# Patient Record
Sex: MALE | Race: WHITE | NOT HISPANIC OR LATINO | URBAN - METROPOLITAN AREA
[De-identification: names, ages, dates, MRNs, and addresses within clinical notes are randomized per-mention and may not be internally consistent; named-entity substitution may affect disease eponyms.]

---

## 2018-10-23 ENCOUNTER — OUTPATIENT (OUTPATIENT)
Dept: OUTPATIENT SERVICES | Facility: HOSPITAL | Age: 63
LOS: 1 days | Discharge: HOME | End: 2018-10-23

## 2018-10-23 DIAGNOSIS — R05 COUGH: ICD-10-CM

## 2018-10-23 DIAGNOSIS — M25.9 JOINT DISORDER, UNSPECIFIED: ICD-10-CM

## 2018-12-20 ENCOUNTER — OUTPATIENT (OUTPATIENT)
Dept: OUTPATIENT SERVICES | Facility: HOSPITAL | Age: 63
LOS: 1 days | Discharge: HOME | End: 2018-12-20

## 2018-12-20 VITALS
SYSTOLIC BLOOD PRESSURE: 131 MMHG | DIASTOLIC BLOOD PRESSURE: 73 MMHG | TEMPERATURE: 98 F | OXYGEN SATURATION: 98 % | WEIGHT: 299.83 LBS | HEIGHT: 73 IN

## 2018-12-20 DIAGNOSIS — T84.84XA PAIN DUE TO INTERNAL ORTHOPEDIC PROSTHETIC DEVICES, IMPLANTS AND GRAFTS, INITIAL ENCOUNTER: ICD-10-CM

## 2018-12-20 DIAGNOSIS — Z01.818 ENCOUNTER FOR OTHER PREPROCEDURAL EXAMINATION: ICD-10-CM

## 2018-12-20 DIAGNOSIS — Z98.890 OTHER SPECIFIED POSTPROCEDURAL STATES: Chronic | ICD-10-CM

## 2018-12-20 LAB
ALBUMIN SERPL ELPH-MCNC: 4.4 G/DL — SIGNIFICANT CHANGE UP (ref 3.5–5.2)
ALP SERPL-CCNC: 77 U/L — SIGNIFICANT CHANGE UP (ref 30–115)
ALT FLD-CCNC: 44 U/L — HIGH (ref 0–41)
ANION GAP SERPL CALC-SCNC: 16 MMOL/L — HIGH (ref 7–14)
APPEARANCE UR: CLEAR — SIGNIFICANT CHANGE UP
APTT BLD: 31.1 SEC — SIGNIFICANT CHANGE UP (ref 27–39.2)
AST SERPL-CCNC: 22 U/L — SIGNIFICANT CHANGE UP (ref 0–41)
BASOPHILS # BLD AUTO: 0.12 K/UL — SIGNIFICANT CHANGE UP (ref 0–0.2)
BASOPHILS NFR BLD AUTO: 1.4 % — HIGH (ref 0–1)
BILIRUB SERPL-MCNC: 0.3 MG/DL — SIGNIFICANT CHANGE UP (ref 0.2–1.2)
BILIRUB UR-MCNC: NEGATIVE — SIGNIFICANT CHANGE UP
BLD GP AB SCN SERPL QL: SIGNIFICANT CHANGE UP
BUN SERPL-MCNC: 17 MG/DL — SIGNIFICANT CHANGE UP (ref 10–20)
CALCIUM SERPL-MCNC: 9.6 MG/DL — SIGNIFICANT CHANGE UP (ref 8.5–10.1)
CHLORIDE SERPL-SCNC: 99 MMOL/L — SIGNIFICANT CHANGE UP (ref 98–110)
CO2 SERPL-SCNC: 25 MMOL/L — SIGNIFICANT CHANGE UP (ref 17–32)
COLOR SPEC: YELLOW — SIGNIFICANT CHANGE UP
CREAT SERPL-MCNC: 0.9 MG/DL — SIGNIFICANT CHANGE UP (ref 0.7–1.5)
DIFF PNL FLD: NEGATIVE — SIGNIFICANT CHANGE UP
EOSINOPHIL # BLD AUTO: 0.3 K/UL — SIGNIFICANT CHANGE UP (ref 0–0.7)
EOSINOPHIL NFR BLD AUTO: 3.6 % — SIGNIFICANT CHANGE UP (ref 0–8)
EPI CELLS # UR: ABNORMAL /HPF
GLUCOSE SERPL-MCNC: 93 MG/DL — SIGNIFICANT CHANGE UP (ref 70–99)
GLUCOSE UR QL: 100 MG/DL
HCT VFR BLD CALC: 48.2 % — SIGNIFICANT CHANGE UP (ref 42–52)
HGB BLD-MCNC: 15.4 G/DL — SIGNIFICANT CHANGE UP (ref 14–18)
IMM GRANULOCYTES NFR BLD AUTO: 1.1 % — HIGH (ref 0.1–0.3)
INR BLD: 0.96 RATIO — SIGNIFICANT CHANGE UP (ref 0.65–1.3)
KETONES UR-MCNC: NEGATIVE — SIGNIFICANT CHANGE UP
LEUKOCYTE ESTERASE UR-ACNC: ABNORMAL
LYMPHOCYTES # BLD AUTO: 2.16 K/UL — SIGNIFICANT CHANGE UP (ref 1.2–3.4)
LYMPHOCYTES # BLD AUTO: 25.9 % — SIGNIFICANT CHANGE UP (ref 20.5–51.1)
MCHC RBC-ENTMCNC: 24.6 PG — LOW (ref 27–31)
MCHC RBC-ENTMCNC: 32 G/DL — SIGNIFICANT CHANGE UP (ref 32–37)
MCV RBC AUTO: 76.9 FL — LOW (ref 80–94)
MONOCYTES # BLD AUTO: 0.89 K/UL — HIGH (ref 0.1–0.6)
MONOCYTES NFR BLD AUTO: 10.7 % — HIGH (ref 1.7–9.3)
MRSA PCR RESULT.: NEGATIVE — SIGNIFICANT CHANGE UP
NEUTROPHILS # BLD AUTO: 4.77 K/UL — SIGNIFICANT CHANGE UP (ref 1.4–6.5)
NEUTROPHILS NFR BLD AUTO: 57.3 % — SIGNIFICANT CHANGE UP (ref 42.2–75.2)
NITRITE UR-MCNC: NEGATIVE — SIGNIFICANT CHANGE UP
NRBC # BLD: 0 /100 WBCS — SIGNIFICANT CHANGE UP (ref 0–0)
PH UR: 6.5 — SIGNIFICANT CHANGE UP (ref 5–8)
PLATELET # BLD AUTO: 273 K/UL — SIGNIFICANT CHANGE UP (ref 130–400)
POTASSIUM SERPL-MCNC: 4.9 MMOL/L — SIGNIFICANT CHANGE UP (ref 3.5–5)
POTASSIUM SERPL-SCNC: 4.9 MMOL/L — SIGNIFICANT CHANGE UP (ref 3.5–5)
PROT SERPL-MCNC: 7 G/DL — SIGNIFICANT CHANGE UP (ref 6–8)
PROT UR-MCNC: NEGATIVE MG/DL — SIGNIFICANT CHANGE UP
PROTHROM AB SERPL-ACNC: 11.1 SEC — SIGNIFICANT CHANGE UP (ref 9.95–12.87)
RBC # BLD: 6.27 M/UL — HIGH (ref 4.7–6.1)
RBC # FLD: 15.4 % — HIGH (ref 11.5–14.5)
SODIUM SERPL-SCNC: 140 MMOL/L — SIGNIFICANT CHANGE UP (ref 135–146)
SP GR SPEC: 1.01 — SIGNIFICANT CHANGE UP (ref 1.01–1.03)
TYPE + AB SCN PNL BLD: SIGNIFICANT CHANGE UP
UROBILINOGEN FLD QL: 0.2 MG/DL — SIGNIFICANT CHANGE UP (ref 0.2–0.2)
WBC # BLD: 8.33 K/UL — SIGNIFICANT CHANGE UP (ref 4.8–10.8)
WBC # FLD AUTO: 8.33 K/UL — SIGNIFICANT CHANGE UP (ref 4.8–10.8)
WBC UR QL: SIGNIFICANT CHANGE UP /HPF

## 2018-12-20 NOTE — H&P PST ADULT - PMH
HTN (hypertension)    OA (osteoarthritis)    Obesity (BMI 35.0-39.9 without comorbidity)    [place] on CPAP

## 2018-12-20 NOTE — H&P PST ADULT - FAMILY HISTORY
Mother  Still living? Unknown  Family history of lymphoma, Age at diagnosis: Age Unknown  HTN (hypertension), Age at diagnosis: Age Unknown  DM (diabetes mellitus), Age at diagnosis: Age Unknown     Father  Still living? Unknown  HTN (hypertension), Age at diagnosis: Age Unknown

## 2018-12-20 NOTE — H&P PST ADULT - HISTORY OF PRESENT ILLNESS
62 Y/O MALE PRESENTS TO PAST WITH HX  OA, PAINFUL RIGHT KNEE. PT NOW FOR REVISION RIGHT TKR PT NOW FOR SCHEDULED PROCEDURE. PT DENIES ANY CP SOB PALP COUGH DYSURIA FEVER URI. PT ABLE TO EPHRAIM 1-2 FOS W/O SOB 62 Y/O MALE PRESENTS TO PAST WITH HX  OA, PAINFUL RIGHT KNEE. PT NOW FOR REVISION RIGHT TKR PT NOW FOR SCHEDULED PROCEDURE. PT DENIES ANY CP SOB PALP COUGH DYSURIA FEVER URI. PT ABLE TO [facility]RA[facility] 1-2 FOS W/O SOB  WORKERS COMP 9/18/12

## 2018-12-20 NOTE — H&P PST ADULT - PSH
History of surgery  RIGHT SHOULDER ARTHROSCOPY  B/L KNEE ARTHROSCOPY  RIGHT TKR- 4/17 History of surgery  RIGHT HIP SX  B/L KNEE ARTHROSCOPY  RIGHT TKR- 4/17

## 2018-12-21 LAB
CULTURE RESULTS: SIGNIFICANT CHANGE UP
SPECIMEN SOURCE: SIGNIFICANT CHANGE UP

## 2018-12-22 PROBLEM — Z00.00 ENCOUNTER FOR PREVENTIVE HEALTH EXAMINATION: Status: ACTIVE | Noted: 2018-12-22

## 2019-01-10 ENCOUNTER — APPOINTMENT (OUTPATIENT)
Dept: ORTHOPEDIC SURGERY | Facility: HOSPITAL | Age: 64
End: 2019-01-10
Payer: OTHER MISCELLANEOUS

## 2019-01-10 ENCOUNTER — INPATIENT (INPATIENT)
Facility: HOSPITAL | Age: 64
LOS: 4 days | Discharge: HOME HEALTH | End: 2019-01-15
Attending: ORTHOPAEDIC SURGERY | Admitting: ORTHOPAEDIC SURGERY

## 2019-01-10 VITALS
HEIGHT: 73 IN | SYSTOLIC BLOOD PRESSURE: 161 MMHG | HEART RATE: 69 BPM | RESPIRATION RATE: 20 BRPM | TEMPERATURE: 99 F | DIASTOLIC BLOOD PRESSURE: 85 MMHG | WEIGHT: 285.06 LBS

## 2019-01-10 DIAGNOSIS — G47.33 OBSTRUCTIVE SLEEP APNEA (ADULT) (PEDIATRIC): ICD-10-CM

## 2019-01-10 DIAGNOSIS — I10 ESSENTIAL (PRIMARY) HYPERTENSION: ICD-10-CM

## 2019-01-10 DIAGNOSIS — Z98.890 OTHER SPECIFIED POSTPROCEDURAL STATES: Chronic | ICD-10-CM
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
## 2019-01-10 DIAGNOSIS — E66.9 OBESITY, UNSPECIFIED: ICD-10-CM

## 2019-01-10 DIAGNOSIS — I87.8 OTHER SPECIFIED DISORDERS OF VEINS: ICD-10-CM

## 2019-01-10 DIAGNOSIS — T84.092A OTHER MECHANICAL COMPLICATION OF INTERNAL RIGHT KNEE PROSTHESIS, INITIAL ENCOUNTER: ICD-10-CM
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
## 2019-01-10 LAB
ABO RH CONFIRMATION: SIGNIFICANT CHANGE UP
GLUCOSE BLDC GLUCOMTR-MCNC: 108 MG/DL — HIGH (ref 70–99)
GLUCOSE BLDC GLUCOMTR-MCNC: 77 MG/DL — SIGNIFICANT CHANGE UP (ref 70–99)

## 2019-01-10 PROCEDURE — 27487 REVISE/REPLACE KNEE JOINT: CPT | Mod: RT

## 2019-01-10 RX ORDER — AMLODIPINE BESYLATE 2.5 MG/1
10 TABLET ORAL DAILY
Qty: 0 | Refills: 0 | Status: DISCONTINUED | OUTPATIENT
Start: 2019-01-10 | End: 2019-01-15

## 2019-01-10 RX ORDER — SODIUM CHLORIDE 9 MG/ML
1000 INJECTION, SOLUTION INTRAVENOUS
Qty: 0 | Refills: 0 | Status: DISCONTINUED | OUTPATIENT
Start: 2019-01-10 | End: 2019-01-11

## 2019-01-10 RX ORDER — DOCUSATE SODIUM 100 MG
100 CAPSULE ORAL THREE TIMES A DAY
Qty: 0 | Refills: 0 | Status: DISCONTINUED | OUTPATIENT
Start: 2019-01-10 | End: 2019-01-15

## 2019-01-10 RX ORDER — SENNA PLUS 8.6 MG/1
1 TABLET ORAL
Qty: 0 | Refills: 0 | Status: DISCONTINUED | OUTPATIENT
Start: 2019-01-10 | End: 2019-01-15

## 2019-01-10 RX ORDER — OXYCODONE HYDROCHLORIDE 5 MG/1
10 TABLET ORAL EVERY 4 HOURS
Qty: 0 | Refills: 0 | Status: DISCONTINUED | OUTPATIENT
Start: 2019-01-10 | End: 2019-01-15

## 2019-01-10 RX ORDER — ACETAMINOPHEN 500 MG
650 TABLET ORAL EVERY 6 HOURS
Qty: 0 | Refills: 0 | Status: COMPLETED | OUTPATIENT
Start: 2019-01-10 | End: 2019-01-13

## 2019-01-10 RX ORDER — PANTOPRAZOLE SODIUM 20 MG/1
40 TABLET, DELAYED RELEASE ORAL
Qty: 0 | Refills: 0 | Status: DISCONTINUED | OUTPATIENT
Start: 2019-01-10 | End: 2019-01-15

## 2019-01-10 RX ORDER — CELECOXIB 200 MG/1
200 CAPSULE ORAL EVERY 12 HOURS
Qty: 0 | Refills: 0 | Status: DISCONTINUED | OUTPATIENT
Start: 2019-01-10 | End: 2019-01-15

## 2019-01-10 RX ORDER — MORPHINE SULFATE 50 MG/1
2 CAPSULE, EXTENDED RELEASE ORAL
Qty: 0 | Refills: 0 | Status: DISCONTINUED | OUTPATIENT
Start: 2019-01-10 | End: 2019-01-11

## 2019-01-10 RX ORDER — MORPHINE SULFATE 50 MG/1
4 CAPSULE, EXTENDED RELEASE ORAL
Qty: 0 | Refills: 0 | Status: DISCONTINUED | OUTPATIENT
Start: 2019-01-10 | End: 2019-01-11

## 2019-01-10 RX ORDER — ASPIRIN/CALCIUM CARB/MAGNESIUM 324 MG
325 TABLET ORAL EVERY 12 HOURS
Qty: 0 | Refills: 0 | Status: DISCONTINUED | OUTPATIENT
Start: 2019-01-11 | End: 2019-01-15

## 2019-01-10 RX ORDER — MORPHINE SULFATE 50 MG/1
2 CAPSULE, EXTENDED RELEASE ORAL EVERY 4 HOURS
Qty: 0 | Refills: 0 | Status: DISCONTINUED | OUTPATIENT
Start: 2019-01-10 | End: 2019-01-15

## 2019-01-10 RX ORDER — FERROUS SULFATE 325(65) MG
325 TABLET ORAL
Qty: 0 | Refills: 0 | Status: DISCONTINUED | OUTPATIENT
Start: 2019-01-10 | End: 2019-01-15

## 2019-01-10 RX ORDER — ONDANSETRON 8 MG/1
4 TABLET, FILM COATED ORAL EVERY 6 HOURS
Qty: 0 | Refills: 0 | Status: DISCONTINUED | OUTPATIENT
Start: 2019-01-10 | End: 2019-01-15

## 2019-01-10 RX ORDER — SODIUM CHLORIDE 9 MG/ML
1000 INJECTION INTRAMUSCULAR; INTRAVENOUS; SUBCUTANEOUS
Qty: 0 | Refills: 0 | Status: DISCONTINUED | OUTPATIENT
Start: 2019-01-10 | End: 2019-01-15

## 2019-01-10 RX ORDER — OXYCODONE HYDROCHLORIDE 5 MG/1
5 TABLET ORAL EVERY 4 HOURS
Qty: 0 | Refills: 0 | Status: DISCONTINUED | OUTPATIENT
Start: 2019-01-10 | End: 2019-01-15

## 2019-01-10 RX ORDER — ONDANSETRON 8 MG/1
4 TABLET, FILM COATED ORAL ONCE
Qty: 0 | Refills: 0 | Status: DISCONTINUED | OUTPATIENT
Start: 2019-01-10 | End: 2019-01-11

## 2019-01-10 RX ORDER — LOSARTAN POTASSIUM 100 MG/1
100 TABLET, FILM COATED ORAL DAILY
Qty: 0 | Refills: 0 | Status: DISCONTINUED | OUTPATIENT
Start: 2019-01-10 | End: 2019-01-15

## 2019-01-10 RX ORDER — CHLORHEXIDINE GLUCONATE 213 G/1000ML
1 SOLUTION TOPICAL
Qty: 0 | Refills: 0 | Status: DISCONTINUED | OUTPATIENT
Start: 2019-01-10 | End: 2019-01-15

## 2019-01-10 RX ORDER — MEPERIDINE HYDROCHLORIDE 50 MG/ML
12.5 INJECTION INTRAMUSCULAR; INTRAVENOUS; SUBCUTANEOUS
Qty: 0 | Refills: 0 | Status: DISCONTINUED | OUTPATIENT
Start: 2019-01-10 | End: 2019-01-11

## 2019-01-10 RX ORDER — CEFAZOLIN SODIUM 1 G
2000 VIAL (EA) INJECTION EVERY 8 HOURS
Qty: 0 | Refills: 0 | Status: COMPLETED | OUTPATIENT
Start: 2019-01-10 | End: 2019-01-11

## 2019-01-10 RX ADMIN — SODIUM CHLORIDE 100 MILLILITER(S): 9 INJECTION INTRAMUSCULAR; INTRAVENOUS; SUBCUTANEOUS at 22:32

## 2019-01-10 RX ADMIN — SODIUM CHLORIDE 100 MILLILITER(S): 9 INJECTION INTRAMUSCULAR; INTRAVENOUS; SUBCUTANEOUS at 22:34

## 2019-01-10 RX ADMIN — SODIUM CHLORIDE 120 MILLILITER(S): 9 INJECTION, SOLUTION INTRAVENOUS at 18:37

## 2019-01-10 RX ADMIN — SODIUM CHLORIDE 100 MILLILITER(S): 9 INJECTION INTRAMUSCULAR; INTRAVENOUS; SUBCUTANEOUS at 22:33

## 2019-01-10 RX ADMIN — Medication 100 MILLIGRAM(S): at 23:22

## 2019-01-10 RX ADMIN — SODIUM CHLORIDE 100 MILLILITER(S): 9 INJECTION INTRAMUSCULAR; INTRAVENOUS; SUBCUTANEOUS at 22:31

## 2019-01-10 NOTE — BRIEF OPERATIVE NOTE - PROCEDURE
<<-----Click on this checkbox to enter Procedure Knee replacement, revision  01/10/2019    Active  LATRICE

## 2019-01-10 NOTE — CHART NOTE - NSCHARTNOTEFT_GEN_A_CORE
PACU ANESTHESIA ADMISSION NOTE      Procedure: Knee replacement, revision    Post op diagnosis:  Failure of arthroplasty, initial encounter      ____  Intubated  TV:______       Rate: ______      FiO2: ______    _x___  Patent Airway    _x___  Full return of protective reflexes    _x___  Full recovery from anesthesia / back to baseline status    Vitals:  T(F) 97  HR: 62  BP: 117/56  RR: 20 (  SpO2: 99 --    Mental Status:  _x___ Awake   _____ Alert   _____ Drowsy   _____ Sedated    Nausea/Vomiting:  _x___  NO       ______Yes,   See Post - Op Orders         Pain Scale (0-10):  __0___    Treatment: _x___ None    ____ See Post - Op/PCA Orders    Post - Operative Fluids:   __x__ Oral   ____ See Post - Op Orders    Plan: Discharge:   ____Home       __x ___Floor     _____Critical Care    _____  Other:_________________    Comments:  No anesthesia issues or complications noted.  Discharge when criteria met.

## 2019-01-11 LAB
ANION GAP SERPL CALC-SCNC: 15 MMOL/L — HIGH (ref 7–14)
BUN SERPL-MCNC: 16 MG/DL — SIGNIFICANT CHANGE UP (ref 10–20)
CALCIUM SERPL-MCNC: 8.8 MG/DL — SIGNIFICANT CHANGE UP (ref 8.5–10.1)
CHLORIDE SERPL-SCNC: 100 MMOL/L — SIGNIFICANT CHANGE UP (ref 98–110)
CO2 SERPL-SCNC: 26 MMOL/L — SIGNIFICANT CHANGE UP (ref 17–32)
CREAT SERPL-MCNC: 1 MG/DL — SIGNIFICANT CHANGE UP (ref 0.7–1.5)
GLUCOSE SERPL-MCNC: 105 MG/DL — HIGH (ref 70–99)
HCT VFR BLD CALC: 44.9 % — SIGNIFICANT CHANGE UP (ref 42–52)
HGB BLD-MCNC: 14.1 G/DL — SIGNIFICANT CHANGE UP (ref 14–18)
MCHC RBC-ENTMCNC: 24.7 PG — LOW (ref 27–31)
MCHC RBC-ENTMCNC: 31.4 G/DL — LOW (ref 32–37)
MCV RBC AUTO: 78.8 FL — LOW (ref 80–94)
NRBC # BLD: 0 /100 WBCS — SIGNIFICANT CHANGE UP (ref 0–0)
PLATELET # BLD AUTO: 148 K/UL — SIGNIFICANT CHANGE UP (ref 130–400)
POTASSIUM SERPL-MCNC: 4.2 MMOL/L — SIGNIFICANT CHANGE UP (ref 3.5–5)
POTASSIUM SERPL-SCNC: 4.2 MMOL/L — SIGNIFICANT CHANGE UP (ref 3.5–5)
RBC # BLD: 5.7 M/UL — SIGNIFICANT CHANGE UP (ref 4.7–6.1)
RBC # FLD: 15.3 % — HIGH (ref 11.5–14.5)
SODIUM SERPL-SCNC: 141 MMOL/L — SIGNIFICANT CHANGE UP (ref 135–146)
WBC # BLD: 8.11 K/UL — SIGNIFICANT CHANGE UP (ref 4.8–10.8)
WBC # FLD AUTO: 8.11 K/UL — SIGNIFICANT CHANGE UP (ref 4.8–10.8)

## 2019-01-11 RX ORDER — INFLUENZA VIRUS VACCINE 15; 15; 15; 15 UG/.5ML; UG/.5ML; UG/.5ML; UG/.5ML
0.5 SUSPENSION INTRAMUSCULAR ONCE
Qty: 0 | Refills: 0 | Status: COMPLETED | OUTPATIENT
Start: 2019-01-11 | End: 2019-01-11

## 2019-01-11 RX ADMIN — CELECOXIB 200 MILLIGRAM(S): 200 CAPSULE ORAL at 05:48

## 2019-01-11 RX ADMIN — Medication 325 MILLIGRAM(S): at 09:21

## 2019-01-11 RX ADMIN — Medication 650 MILLIGRAM(S): at 01:00

## 2019-01-11 RX ADMIN — Medication 650 MILLIGRAM(S): at 17:50

## 2019-01-11 RX ADMIN — CELECOXIB 200 MILLIGRAM(S): 200 CAPSULE ORAL at 17:49

## 2019-01-11 RX ADMIN — Medication 325 MILLIGRAM(S): at 17:50

## 2019-01-11 RX ADMIN — Medication 100 MILLIGRAM(S): at 05:49

## 2019-01-11 RX ADMIN — Medication 100 MILLIGRAM(S): at 14:08

## 2019-01-11 RX ADMIN — AMLODIPINE BESYLATE 10 MILLIGRAM(S): 2.5 TABLET ORAL at 05:48

## 2019-01-11 RX ADMIN — OXYCODONE HYDROCHLORIDE 5 MILLIGRAM(S): 5 TABLET ORAL at 08:42

## 2019-01-11 RX ADMIN — Medication 1 TABLET(S): at 15:32

## 2019-01-11 RX ADMIN — SENNA PLUS 1 TABLET(S): 8.6 TABLET ORAL at 05:47

## 2019-01-11 RX ADMIN — Medication 650 MILLIGRAM(S): at 00:16

## 2019-01-11 RX ADMIN — Medication 325 MILLIGRAM(S): at 14:08

## 2019-01-11 RX ADMIN — Medication 650 MILLIGRAM(S): at 23:44

## 2019-01-11 RX ADMIN — Medication 650 MILLIGRAM(S): at 14:08

## 2019-01-11 RX ADMIN — Medication 325 MILLIGRAM(S): at 05:49

## 2019-01-11 RX ADMIN — Medication 100 MILLIGRAM(S): at 21:13

## 2019-01-11 RX ADMIN — SENNA PLUS 1 TABLET(S): 8.6 TABLET ORAL at 17:49

## 2019-01-11 RX ADMIN — Medication 100 MILLIGRAM(S): at 05:51

## 2019-01-11 RX ADMIN — Medication 650 MILLIGRAM(S): at 05:46

## 2019-01-11 RX ADMIN — PANTOPRAZOLE SODIUM 40 MILLIGRAM(S): 20 TABLET, DELAYED RELEASE ORAL at 06:33

## 2019-01-11 RX ADMIN — LOSARTAN POTASSIUM 100 MILLIGRAM(S): 100 TABLET, FILM COATED ORAL at 06:33

## 2019-01-11 NOTE — PHYSICAL THERAPY INITIAL EVALUATION ADULT - RANGE OF MOTION EXAMINATION, REHAB EVAL
bilateral upper extremity ROM was WFL (within functional limits)/R HIP/ANKLE WFLs; R knee -30-45 deg (limited by bandage)/Left LE ROM was WFL (within functional limits)

## 2019-01-11 NOTE — PHYSICAL THERAPY INITIAL EVALUATION ADULT - IMPAIRMENTS FOUND, PT EVAL
anthropometric characteristics/gross motor/gait, locomotion, and balance/integumentary integrity/ergonomics and body mechanics/aerobic capacity/endurance

## 2019-01-11 NOTE — OCCUPATIONAL THERAPY INITIAL EVALUATION ADULT - GENERAL OBSERVATIONS, REHAB EVAL
pt received semi ricci in bed in NAD, agreeable to OT eval, +R knee ace wrap, returned to bed following OT eval at pt request

## 2019-01-11 NOTE — PHYSICAL THERAPY INITIAL EVALUATION ADULT - ADDITIONAL COMMENTS
Pt has 13+3 steps to enter. He was independent prior to admission. Pt reports after previous knee replacement in 2017, pt used Standard Cane or crutches.

## 2019-01-11 NOTE — OCCUPATIONAL THERAPY INITIAL EVALUATION ADULT - PLANNED THERAPY INTERVENTIONS, OT EVAL
balance training/joint mobilization/ROM/ADL retraining/bed mobility training/strengthening/stretching/transfer training

## 2019-01-11 NOTE — PROGRESS NOTE ADULT - SUBJECTIVE AND OBJECTIVE BOX
Ortho Post op check    Pt S&E, NNC, pain controlled, denies cp/sob    PE:  NAD  Unlabored  LE:  Dressings c/d/i  RLE calf soft, comrpessible  NVI    A/P: s/p R revision TKA  PT/rehab  Pain control  DVT ppx  IV antibiotics x24hrs  Dispo planning

## 2019-01-11 NOTE — CONSULT NOTE ADULT - ASSESSMENT
IMPRESSION: Rehab of revision of R TKR    PRECAUTIONS: [  ] Cardiac  [  ] Respiratory  [  ] Seizures [  ] Contact Isolation  [  ] Droplet Isolation  [  ] Other    Weight Bearing Status:     RECOMMENDATION:    Out of Bed to Chair     DVT/Decubiti Prophylaxis    REHAB PLAN:     [x   ] Bedside P/T 3-5 times a week   [ x  ]   Bedside O/T  2-3 times a week             [   ] No Rehab Therapy Indicated                   [   ]  Speech Therapy   Conditioning/ROM                                    ADL  Bed Mobility                                               Conditioning/ROM  Transfers                                                     Bed Mobility  Sitting /Standing Balance                         Transfers                                        Gait Training                                               Sitting/Standing Balance  Stair Training [   ]Applicable                    Home equipment Eval                                                                        Splinting  [   ] Only      GOALS:   ADL   [ x  ]   Independent                    Transfers  [ x  ] Independent                          Ambulation  [ x  ] Independent     [   x ] With device                            [   ]  CG                                                         [   ]  CG                                                                  [   ] CG                            [    ] Min A                                                   [   ] Min A                                                              [   ] Min  A          DISCHARGE PLAN:   [   ]  Good candidate for Intensive Rehabilitation/Hospital based                                             Will tolerate 3hrs Intensive Rehab Daily                                       [ x   ]  Short Term Rehab in Skilled Nursing Facility                              vs         [ x   ]  Home with Outpatient or VN services                                         [    ]  Possible Candidate for Intensive Hospital based Rehab

## 2019-01-11 NOTE — PROGRESS NOTE ADULT - SUBJECTIVE AND OBJECTIVE BOX
ORTHO PROGRESS NOTE       63yMale POD #   1   S/P right TKA revision     Patient seen and examined at bedside . The patient is awake and alert in NAD. No complaints of chest pain, SOB, N/V.    Vital Signs Last 24 Hrs  T(C): 37 (11 Jan 2019 07:30), Max: 37.2 (10 Garret 2019 10:31)  T(F): 98.6 (11 Jan 2019 07:30), Max: 98.9 (10 Garret 2019 10:31)  HR: 77 (11 Jan 2019 07:30) (58 - 82)  BP: 144/66 (11 Jan 2019 07:30) (117/56 - 165/87)  BP(mean): --  RR: 18 (11 Jan 2019 07:30) (11 - 26)  SpO2: 96% (11 Jan 2019 03:00) (96% - 100%)      DVT ppx : aspirin     MEDICATIONS  (STANDING):  acetaminophen   Tablet .. 650 milliGRAM(s) Oral every 6 hours  amLODIPine   Tablet 10 milliGRAM(s) Oral daily  aspirin enteric coated 325 milliGRAM(s) Oral every 12 hours  celecoxib 200 milliGRAM(s) Oral every 12 hours  docusate sodium 100 milliGRAM(s) Oral three times a day  ferrous    sulfate 325 milliGRAM(s) Oral three times a day with meals  losartan 100 milliGRAM(s) Oral daily  multivitamin 1 Tablet(s) Oral daily  pantoprazole    Tablet 40 milliGRAM(s) Oral before breakfast  senna 1 Tablet(s) Oral two times a day  sodium chloride 0.9%. 1000 milliLiter(s) (100 mL/Hr) IV Continuous <Continuous>    MEDICATIONS  (PRN):  chlorhexidine 4% Liquid 1 Application(s) Topical <User Schedule> PRN prn incontinence  morphine IVPB 2 milliGRAM(s) IV Intermittent every 4 hours PRN Severe Pain (7 - 10)  ondansetron Injectable 4 milliGRAM(s) IV Push every 6 hours PRN Nausea and/or Vomiting  oxyCODONE    IR 10 milliGRAM(s) Oral every 4 hours PRN Moderate Pain (4 - 6)  oxyCODONE    IR 5 milliGRAM(s) Oral every 4 hours PRN Mild Pain (1 - 3)      PE:   right knee dressing C/D/I          Compartments soft         NVI, SILT           A/P: 63yMale POD # 1   S/P  right TKA revision             OOB to Chair            Physical Therapy           Pain control            Incentive Spirometry            DVT Prophylaxis            Discharge planning

## 2019-01-11 NOTE — CONSULT NOTE ADULT - SUBJECTIVE AND OBJECTIVE BOX
HPI: 64 yo M admitted on 1/10 for revision of R TKR for failed R TKR, wbat as per ortho. Prior to hospitalization he was ambulating independent.      PAST MEDICAL & SURGICAL HISTORY:  OA (osteoarthritis)  [place] on CPAP  Obesity (BMI 35.0-39.9 without comorbidity)  HTN (hypertension)  History of surgery: RIGHT HIP SX  B/L KNEE ARTHROSCOPY  RIGHT TKR- 4/17      Hospital Course:    TODAY'S SUBJECTIVE & REVIEW OF SYMPTOMS:     Constitutional WNL   Cardio WNL   Resp WNL   GI WNL  Heme WNL  Endo WNL  Skin WNL  MSK pain  Neuro WNL  Cognitive WNL  Psych WNL      MEDICATIONS  (STANDING):  acetaminophen   Tablet .. 650 milliGRAM(s) Oral every 6 hours  amLODIPine   Tablet 10 milliGRAM(s) Oral daily  aspirin enteric coated 325 milliGRAM(s) Oral every 12 hours  celecoxib 200 milliGRAM(s) Oral every 12 hours  docusate sodium 100 milliGRAM(s) Oral three times a day  ferrous    sulfate 325 milliGRAM(s) Oral three times a day with meals  losartan 100 milliGRAM(s) Oral daily  multivitamin 1 Tablet(s) Oral daily  pantoprazole    Tablet 40 milliGRAM(s) Oral before breakfast  senna 1 Tablet(s) Oral two times a day  sodium chloride 0.9%. 1000 milliLiter(s) (100 mL/Hr) IV Continuous <Continuous>    MEDICATIONS  (PRN):  chlorhexidine 4% Liquid 1 Application(s) Topical <User Schedule> PRN prn incontinence  morphine IVPB 2 milliGRAM(s) IV Intermittent every 4 hours PRN Severe Pain (7 - 10)  ondansetron Injectable 4 milliGRAM(s) IV Push every 6 hours PRN Nausea and/or Vomiting  oxyCODONE    IR 10 milliGRAM(s) Oral every 4 hours PRN Moderate Pain (4 - 6)  oxyCODONE    IR 5 milliGRAM(s) Oral every 4 hours PRN Mild Pain (1 - 3)      FAMILY HISTORY:  DM (diabetes mellitus) (Mother)  HTN (hypertension) (Mother, Father)  Family history of lymphoma (Mother)      Allergies    No Known Allergies    Intolerances        SOCIAL HISTORY:    [  ] Etoh  [  ] Smoking  [  ] Substance abuse     Home Environment:  [ x ] Home Alone  [  ] Lives with Family  [  ] Home Health Aid    Dwelling:  [x  ] Apartment  [  ] Private House  [  ] Adult Home  [  ] Skilled Nursing Facility      [  ] Short Term  [  ] Long Term  [ x ] Stairs       Elevator [  ]    FUNCTIONAL STATUS PTA: (Check all that apply)  Ambulation: [ x  ]Independent    [  ] Dependent     [  ] Non-Ambulatory  Assistive Device: [  ] SA Cane  [  ]  Q Cane  [  ] Walker  [  ]  Wheelchair  ADL : [x  ] Independent  [  ]  Dependent       Vital Signs Last 24 Hrs  T(C): 37 (11 Jan 2019 07:30), Max: 37.2 (10 Garret 2019 10:31)  T(F): 98.6 (11 Jan 2019 07:30), Max: 98.9 (10 Garret 2019 10:31)  HR: 77 (11 Jan 2019 07:30) (58 - 82)  BP: 144/66 (11 Jan 2019 07:30) (117/56 - 165/87)  BP(mean): --  RR: 18 (11 Jan 2019 07:30) (11 - 26)  SpO2: 96% (11 Jan 2019 03:00) (96% - 100%)      PHYSICAL EXAM: Alert & Oriented X3  GENERAL: NAD, well-groomed, well-developed  HEAD:  Atraumatic, Normocephalic  CHEST/LUNG: Clear   HEART: S1S2+  ABDOMEN: Soft, Nontender  EXTREMITIES:  no calf tenderness    NERVOUS SYSTEM:  Cranial Nerves 2-12 intact [  ] Abnormal  [  ]  ROM: WFL all extremities [  ]  Abnormal [x  ]limited rle  Motor Strength: WFL all extremities  [  ]  Abnormal [x  ]limited rle  Sensation: intact to light touch [  ] Abnormal [  ]  Reflexes: Symmetric [  ]  Abnormal [  ]    FUNCTIONAL STATUS:  Bed Mobility: Independent [  ]  Supervision [  ]  Needs Assistance [x  ]  N/A [  ]  Transfers: Independent [  ]  Supervision [  ]  Needs Assistance [x  ]  N/A [  ]   Ambulation: Independent [  ]  Supervision [  ]  Needs Assistance [  ]  N/A [  ]  ADL: Independent [  ] Requires Assistance [  ] N/A [  ]      LABS:                RADIOLOGY & ADDITIONAL STUDIES:    Assesment:

## 2019-01-11 NOTE — PHYSICAL THERAPY INITIAL EVALUATION ADULT - CRITERIA FOR SKILLED THERAPEUTIC INTERVENTIONS
therapy frequency/predicted duration of therapy intervention/anticipated equipment needs at discharge/impairments found/risk reduction/prevention/rehab potential/functional limitations in following categories

## 2019-01-11 NOTE — PHYSICAL THERAPY INITIAL EVALUATION ADULT - PERTINENT HX OF CURRENT PROBLEM, REHAB EVAL
Pt adm for revision of R TKR. Original knee replacement was in 2017 and pt reported pain ever since.

## 2019-01-11 NOTE — PROGRESS NOTE ADULT - SUBJECTIVE AND OBJECTIVE BOX
DAMIEN DAS  63y  Male  medical consult    SUBJECTIVE:  patient is POD #1 revision rt TKA  NO SPECIFIC COMPLAINTS    PAST MEDICAL & SURGICAL HISTORY:  OA (osteoarthritis)  [place] on CPAP  Obesity (BMI 35.0-39.9 without comorbidity)  HTN (hypertension)  History of surgery: RIGHT HIP SX - 2013  B/L KNEE ARTHROSCOPY  RIGHT TKR- 4/17    63y    REVIEW OF SYSTEMS:    T(C): 37.9 (01-11-19 @ 15:30), Max: 37.9 (01-11-19 @ 15:30)  HR: 103 (01-11-19 @ 15:30) (58 - 103)  BP: 172/75 (01-11-19 @ 15:30) (117/56 - 172/75)  RR: 18 (01-11-19 @ 15:30) (11 - 26)  SpO2: 96% (01-11-19 @ 09:30) (96% - 100%)  Wt(kg): --Vital Signs Last 24 Hrs  T(C): 37.9 (11 Jan 2019 15:30), Max: 37.9 (11 Jan 2019 15:30)  T(F): 100.2 (11 Jan 2019 15:30), Max: 100.2 (11 Jan 2019 15:30)  HR: 103 (11 Jan 2019 15:30) (58 - 103)  BP: 172/75 (11 Jan 2019 15:30) (117/56 - 172/75)  BP(mean): --  RR: 18 (11 Jan 2019 15:30) (11 - 26)  SpO2: 96% (11 Jan 2019 09:30) (96% - 100%)    MEDICATION:  acetaminophen   Tablet .. 650 milliGRAM(s) Oral every 6 hours  amLODIPine   Tablet 10 milliGRAM(s) Oral daily  aspirin enteric coated 325 milliGRAM(s) Oral every 12 hours  celecoxib 200 milliGRAM(s) Oral every 12 hours  chlorhexidine 4% Liquid 1 Application(s) Topical <User Schedule> PRN  docusate sodium 100 milliGRAM(s) Oral three times a day  ferrous    sulfate 325 milliGRAM(s) Oral three times a day with meals  losartan 100 milliGRAM(s) Oral daily  morphine IVPB 2 milliGRAM(s) IV Intermittent every 4 hours PRN  multivitamin 1 Tablet(s) Oral daily  ondansetron Injectable 4 milliGRAM(s) IV Push every 6 hours PRN  oxyCODONE    IR 10 milliGRAM(s) Oral every 4 hours PRN  oxyCODONE    IR 5 milliGRAM(s) Oral every 4 hours PRN  pantoprazole    Tablet 40 milliGRAM(s) Oral before breakfast  senna 1 Tablet(s) Oral two times a day  sodium chloride 0.9%. 1000 milliLiter(s) IV Continuous <Continuous>      LABS:                       14.1   8.11  )-----------( 148      ( 11 Jan 2019 12:24 )             44.9     01-11    141  |  100  |  16  ----------------------------<  105<H>  4.2   |  26  |  1.0    Ca    8.8      11 Jan 2019 12:24    RADIOLOGY:  < from: Xray Chest 2 Views PA/Lat (12.20.18 @ 11:58) >  Impression:      No radiographic evidence of acute cardiopulmonary disease.    Unchanged.    < end of copied text >    < from: 12 Lead ECG (12.20.18 @ 10:52) >  Diagnosis Line Normal sinus rhythm  Nonspecific T wave abnormality  Abnormal ECG    < end of copied text >    PHYSICAL EXAM:  obese w/m in NAD  HEART [id number]  LUNGS CLEAR  ABDOMEN SOFT NONTENDER BS+ UMBILICAL HERNIA+ REDUCIBLE NON TENDER  RT KNEE DRESSING +  CHR HYPERPIGMENTATION CHANGES BOTH LOWER EXTREMITIES    IMPRESSION:  RT TKA REVISION  OSTEOARTHRITIS  HYPERTENSION  [place] ON CPAP  UMBILICAL HERNIA    PLAN:  CONTINUE CPAP  CONTINUE ANTI HTN MEDS  CONTINUE DVT PROPHYLAXIS -   CONTINUE POST OP CARE AS PER SURGERY  CONTINUE  PT AS PER REHAB  PT HAS APPOINTMENT WITH DR MARISCAL FOR HERNIA EVALUATION AS OUT PATIENT

## 2019-01-12 LAB
ANION GAP SERPL CALC-SCNC: 12 MMOL/L — SIGNIFICANT CHANGE UP (ref 7–14)
BUN SERPL-MCNC: 15 MG/DL — SIGNIFICANT CHANGE UP (ref 10–20)
CALCIUM SERPL-MCNC: 9.2 MG/DL — SIGNIFICANT CHANGE UP (ref 8.5–10.1)
CHLORIDE SERPL-SCNC: 103 MMOL/L — SIGNIFICANT CHANGE UP (ref 98–110)
CO2 SERPL-SCNC: 26 MMOL/L — SIGNIFICANT CHANGE UP (ref 17–32)
CREAT SERPL-MCNC: 1.1 MG/DL — SIGNIFICANT CHANGE UP (ref 0.7–1.5)
GLUCOSE SERPL-MCNC: 123 MG/DL — HIGH (ref 70–99)
HCT VFR BLD CALC: 45.4 % — SIGNIFICANT CHANGE UP (ref 42–52)
HGB BLD-MCNC: 14.5 G/DL — SIGNIFICANT CHANGE UP (ref 14–18)
MCHC RBC-ENTMCNC: 25 PG — LOW (ref 27–31)
MCHC RBC-ENTMCNC: 31.9 G/DL — LOW (ref 32–37)
MCV RBC AUTO: 78.4 FL — LOW (ref 80–94)
NRBC # BLD: 0 /100 WBCS — SIGNIFICANT CHANGE UP (ref 0–0)
PLATELET # BLD AUTO: 171 K/UL — SIGNIFICANT CHANGE UP (ref 130–400)
POTASSIUM SERPL-MCNC: 4.6 MMOL/L — SIGNIFICANT CHANGE UP (ref 3.5–5)
POTASSIUM SERPL-SCNC: 4.6 MMOL/L — SIGNIFICANT CHANGE UP (ref 3.5–5)
RBC # BLD: 5.79 M/UL — SIGNIFICANT CHANGE UP (ref 4.7–6.1)
RBC # FLD: 15.8 % — HIGH (ref 11.5–14.5)
SODIUM SERPL-SCNC: 141 MMOL/L — SIGNIFICANT CHANGE UP (ref 135–146)
WBC # BLD: 8.51 K/UL — SIGNIFICANT CHANGE UP (ref 4.8–10.8)
WBC # FLD AUTO: 8.51 K/UL — SIGNIFICANT CHANGE UP (ref 4.8–10.8)

## 2019-01-12 RX ADMIN — OXYCODONE HYDROCHLORIDE 10 MILLIGRAM(S): 5 TABLET ORAL at 16:42

## 2019-01-12 RX ADMIN — CELECOXIB 200 MILLIGRAM(S): 200 CAPSULE ORAL at 18:21

## 2019-01-12 RX ADMIN — Medication 325 MILLIGRAM(S): at 08:26

## 2019-01-12 RX ADMIN — Medication 325 MILLIGRAM(S): at 05:23

## 2019-01-12 RX ADMIN — CELECOXIB 200 MILLIGRAM(S): 200 CAPSULE ORAL at 05:24

## 2019-01-12 RX ADMIN — PANTOPRAZOLE SODIUM 40 MILLIGRAM(S): 20 TABLET, DELAYED RELEASE ORAL at 08:26

## 2019-01-12 RX ADMIN — OXYCODONE HYDROCHLORIDE 5 MILLIGRAM(S): 5 TABLET ORAL at 11:23

## 2019-01-12 RX ADMIN — Medication 100 MILLIGRAM(S): at 21:36

## 2019-01-12 RX ADMIN — Medication 100 MILLIGRAM(S): at 14:07

## 2019-01-12 RX ADMIN — Medication 1 TABLET(S): at 11:24

## 2019-01-12 RX ADMIN — Medication 325 MILLIGRAM(S): at 14:07

## 2019-01-12 RX ADMIN — SENNA PLUS 1 TABLET(S): 8.6 TABLET ORAL at 05:23

## 2019-01-12 RX ADMIN — OXYCODONE HYDROCHLORIDE 10 MILLIGRAM(S): 5 TABLET ORAL at 22:30

## 2019-01-12 RX ADMIN — SENNA PLUS 1 TABLET(S): 8.6 TABLET ORAL at 18:22

## 2019-01-12 RX ADMIN — Medication 650 MILLIGRAM(S): at 18:25

## 2019-01-12 RX ADMIN — OXYCODONE HYDROCHLORIDE 10 MILLIGRAM(S): 5 TABLET ORAL at 21:36

## 2019-01-12 RX ADMIN — Medication 325 MILLIGRAM(S): at 18:21

## 2019-01-12 RX ADMIN — AMLODIPINE BESYLATE 10 MILLIGRAM(S): 2.5 TABLET ORAL at 05:25

## 2019-01-12 RX ADMIN — Medication 100 MILLIGRAM(S): at 05:23

## 2019-01-12 RX ADMIN — Medication 650 MILLIGRAM(S): at 11:24

## 2019-01-12 RX ADMIN — Medication 650 MILLIGRAM(S): at 05:24

## 2019-01-12 RX ADMIN — LOSARTAN POTASSIUM 100 MILLIGRAM(S): 100 TABLET, FILM COATED ORAL at 05:26

## 2019-01-12 RX ADMIN — Medication 650 MILLIGRAM(S): at 23:18

## 2019-01-12 RX ADMIN — Medication 325 MILLIGRAM(S): at 18:22

## 2019-01-13 RX ADMIN — OXYCODONE HYDROCHLORIDE 10 MILLIGRAM(S): 5 TABLET ORAL at 14:23

## 2019-01-13 RX ADMIN — OXYCODONE HYDROCHLORIDE 5 MILLIGRAM(S): 5 TABLET ORAL at 23:04

## 2019-01-13 RX ADMIN — OXYCODONE HYDROCHLORIDE 5 MILLIGRAM(S): 5 TABLET ORAL at 22:02

## 2019-01-13 RX ADMIN — Medication 650 MILLIGRAM(S): at 05:39

## 2019-01-13 RX ADMIN — Medication 325 MILLIGRAM(S): at 11:06

## 2019-01-13 RX ADMIN — Medication 650 MILLIGRAM(S): at 19:00

## 2019-01-13 RX ADMIN — Medication 650 MILLIGRAM(S): at 11:07

## 2019-01-13 RX ADMIN — Medication 100 MILLIGRAM(S): at 05:37

## 2019-01-13 RX ADMIN — SENNA PLUS 1 TABLET(S): 8.6 TABLET ORAL at 05:37

## 2019-01-13 RX ADMIN — Medication 325 MILLIGRAM(S): at 08:17

## 2019-01-13 RX ADMIN — OXYCODONE HYDROCHLORIDE 10 MILLIGRAM(S): 5 TABLET ORAL at 05:37

## 2019-01-13 RX ADMIN — Medication 650 MILLIGRAM(S): at 00:00

## 2019-01-13 RX ADMIN — Medication 100 MILLIGRAM(S): at 13:15

## 2019-01-13 RX ADMIN — CELECOXIB 200 MILLIGRAM(S): 200 CAPSULE ORAL at 18:41

## 2019-01-13 RX ADMIN — Medication 325 MILLIGRAM(S): at 18:41

## 2019-01-13 RX ADMIN — Medication 1 TABLET(S): at 11:06

## 2019-01-13 RX ADMIN — Medication 650 MILLIGRAM(S): at 11:06

## 2019-01-13 RX ADMIN — SENNA PLUS 1 TABLET(S): 8.6 TABLET ORAL at 18:41

## 2019-01-13 RX ADMIN — Medication 100 MILLIGRAM(S): at 22:02

## 2019-01-13 RX ADMIN — LOSARTAN POTASSIUM 100 MILLIGRAM(S): 100 TABLET, FILM COATED ORAL at 05:37

## 2019-01-13 RX ADMIN — Medication 650 MILLIGRAM(S): at 06:30

## 2019-01-13 RX ADMIN — CELECOXIB 200 MILLIGRAM(S): 200 CAPSULE ORAL at 05:37

## 2019-01-13 RX ADMIN — PANTOPRAZOLE SODIUM 40 MILLIGRAM(S): 20 TABLET, DELAYED RELEASE ORAL at 08:17

## 2019-01-13 RX ADMIN — AMLODIPINE BESYLATE 10 MILLIGRAM(S): 2.5 TABLET ORAL at 05:37

## 2019-01-13 RX ADMIN — CELECOXIB 200 MILLIGRAM(S): 200 CAPSULE ORAL at 19:00

## 2019-01-13 RX ADMIN — OXYCODONE HYDROCHLORIDE 10 MILLIGRAM(S): 5 TABLET ORAL at 10:16

## 2019-01-13 RX ADMIN — Medication 650 MILLIGRAM(S): at 18:41

## 2019-01-13 RX ADMIN — OXYCODONE HYDROCHLORIDE 10 MILLIGRAM(S): 5 TABLET ORAL at 10:50

## 2019-01-13 RX ADMIN — OXYCODONE HYDROCHLORIDE 10 MILLIGRAM(S): 5 TABLET ORAL at 14:52

## 2019-01-13 RX ADMIN — OXYCODONE HYDROCHLORIDE 10 MILLIGRAM(S): 5 TABLET ORAL at 06:30

## 2019-01-13 RX ADMIN — Medication 325 MILLIGRAM(S): at 05:39

## 2019-01-13 NOTE — PROGRESS NOTE ADULT - SUBJECTIVE AND OBJECTIVE BOX
01-13-19 @ 10:34    Medical Follow Up    DAMIEN DAS  63y  Male  Comfortable, laying in bed. No ac c/o. Tolerable R knee pain, with dressing, dry.     INTERVAL EVENTS: None    MEDICATIONS  (STANDING):  acetaminophen   Tablet .. 650 milliGRAM(s) Oral every 6 hours  amLODIPine   Tablet 10 milliGRAM(s) Oral daily  aspirin enteric coated 325 milliGRAM(s) Oral every 12 hours  celecoxib 200 milliGRAM(s) Oral every 12 hours  docusate sodium 100 milliGRAM(s) Oral three times a day  ferrous    sulfate 325 milliGRAM(s) Oral three times a day with meals  losartan 100 milliGRAM(s) Oral daily  multivitamin 1 Tablet(s) Oral daily  pantoprazole    Tablet 40 milliGRAM(s) Oral before breakfast  senna 1 Tablet(s) Oral two times a day  sodium chloride 0.9%. 1000 milliLiter(s) (100 mL/Hr) IV Continuous <Continuous>    MEDICATIONS  (PRN):  bisacodyl Suppository 10 milliGRAM(s) Rectal daily PRN If no bowel movement  chlorhexidine 4% Liquid 1 Application(s) Topical <User Schedule> PRN prn incontinence  morphine IVPB 2 milliGRAM(s) IV Intermittent every 4 hours PRN Severe Pain (7 - 10)  ondansetron Injectable 4 milliGRAM(s) IV Push every 6 hours PRN Nausea and/or Vomiting  oxyCODONE    IR 10 milliGRAM(s) Oral every 4 hours PRN Moderate Pain (4 - 6)  oxyCODONE    IR 5 milliGRAM(s) Oral every 4 hours PRN Mild Pain (1 - 3)      T(C): 36.7 (01-13-19 @ 07:30), Max: 36.7 (01-12-19 @ 15:41)  HR: 87 (01-13-19 @ 07:30) (78 - 113)  BP: 148/82 (01-13-19 @ 07:30) (134/70 - 184/85)  RR: 18 (01-13-19 @ 07:30) (18 - 20)  SpO2: --  Wt(kg): --Vital Signs Last 24 Hrs  T(C): 36.7 (13 Jan 2019 07:30), Max: 36.7 (12 Jan 2019 15:41)  T(F): 98 (13 Jan 2019 07:30), Max: 98.1 (12 Jan 2019 15:41)  HR: 87 (13 Jan 2019 07:30) (78 - 113)  BP: 148/82 (13 Jan 2019 07:30) (134/70 - 184/85)  BP(mean): --  RR: 18 (13 Jan 2019 07:30) (18 - 20)  SpO2: --    PHYSICAL EXAM:  GENERAL: NAD, obese.   NECK: supple.   CHEST/LUNG: Clear  HEART: S1S2, regular  ABDOMEN: obese, benign  EXTREMITIES: s/p R knee surgery, dry dressing noted.   Chr pritesh stasis changes both lower legs.                           14.5   8.51  )-----------( 171      ( 12 Jan 2019 07:15 )             45.4     01-12    141  |  103  |  15  ----------------------------<  123<H>  4.6   |  26  |  1.1    Ca    9.2      12 Jan 2019 07:15              RADIOLOGY & ADDITIONAL TESTS:      ASSESSMENT / PLAN  :    1. Past TKR with lack of mobility. : s/p Revision of R TKR on 1/10/19. Started rehab. Being f/u by ortho. No complication so far.   2. HTN : Stable. To cont Rx-s.   3. [place] : uses CPAP.  4. Chr. Pritesh stasis : ROM, elevation when resting.     DVT prophylaxis.     Discharge planning as per ortho.

## 2019-01-13 NOTE — PROGRESS NOTE ADULT - ASSESSMENT
Patient seen and examined at bedside thia AM. He is doign well, pain well controlled. Denies f/c/cp/sob. No new complaints. Working with PT.    Vital Signs Last 24 Hrs  T(C): 36.7 (13 Jan 2019 07:30), Max: 36.7 (12 Jan 2019 15:41)  T(F): 98 (13 Jan 2019 07:30), Max: 98.1 (12 Jan 2019 15:41)  HR: 87 (13 Jan 2019 07:30) (78 - 113)  BP: 148/82 (13 Jan 2019 07:30) (134/70 - 184/85)  BP(mean): --  RR: 18 (13 Jan 2019 07:30) (18 - 20)  SpO2: --    PE:  NAD  unlabored resp on RA  RLE:  dressings c/d/i - changed  incision c/d/i without erythema, fluctuance or drainage  SILT distally  moving all toes  wwp    A/P: 63yMale POD #3  S/P  right TKA revision             -OOB to Chair            -Physical Therapy           -Pain control            -Incentive Spirometry            -DVT Prophylaxis            -Discharge planning

## 2019-01-14 LAB — GLUCOSE BLDC GLUCOMTR-MCNC: 83 MG/DL — SIGNIFICANT CHANGE UP (ref 70–99)

## 2019-01-14 RX ADMIN — AMLODIPINE BESYLATE 10 MILLIGRAM(S): 2.5 TABLET ORAL at 05:56

## 2019-01-14 RX ADMIN — OXYCODONE HYDROCHLORIDE 10 MILLIGRAM(S): 5 TABLET ORAL at 13:17

## 2019-01-14 RX ADMIN — Medication 325 MILLIGRAM(S): at 11:48

## 2019-01-14 RX ADMIN — OXYCODONE HYDROCHLORIDE 5 MILLIGRAM(S): 5 TABLET ORAL at 05:58

## 2019-01-14 RX ADMIN — OXYCODONE HYDROCHLORIDE 5 MILLIGRAM(S): 5 TABLET ORAL at 06:46

## 2019-01-14 RX ADMIN — Medication 100 MILLIGRAM(S): at 05:55

## 2019-01-14 RX ADMIN — SENNA PLUS 1 TABLET(S): 8.6 TABLET ORAL at 05:56

## 2019-01-14 RX ADMIN — LOSARTAN POTASSIUM 100 MILLIGRAM(S): 100 TABLET, FILM COATED ORAL at 05:56

## 2019-01-14 RX ADMIN — CELECOXIB 200 MILLIGRAM(S): 200 CAPSULE ORAL at 05:55

## 2019-01-14 RX ADMIN — Medication 1 TABLET(S): at 11:48

## 2019-01-14 RX ADMIN — OXYCODONE HYDROCHLORIDE 10 MILLIGRAM(S): 5 TABLET ORAL at 18:17

## 2019-01-14 RX ADMIN — Medication 325 MILLIGRAM(S): at 18:18

## 2019-01-14 RX ADMIN — CELECOXIB 200 MILLIGRAM(S): 200 CAPSULE ORAL at 18:18

## 2019-01-14 RX ADMIN — SENNA PLUS 1 TABLET(S): 8.6 TABLET ORAL at 18:18

## 2019-01-14 RX ADMIN — Medication 325 MILLIGRAM(S): at 05:55

## 2019-01-14 RX ADMIN — OXYCODONE HYDROCHLORIDE 10 MILLIGRAM(S): 5 TABLET ORAL at 11:49

## 2019-01-14 NOTE — PROGRESS NOTE ADULT - SUBJECTIVE AND OBJECTIVE BOX
ORTHO PROGRESS NOTE       63yMale POD #  4    S/P right tka revision     Patient seen and examined at bedside . The patient is awake and alert in NAD. No complaints of chest pain, SOB, N/V.    Vital Signs Last 24 Hrs  T(C): 37 (14 Jan 2019 00:00), Max: 37 (14 Jan 2019 00:00)  T(F): 98.6 (14 Jan 2019 00:00), Max: 98.6 (14 Jan 2019 00:00)  HR: 78 (14 Jan 2019 00:00) (78 - 87)  BP: 144/67 (14 Jan 2019 00:00) (144/67 - 149/67)  BP(mean): --  RR: 19 (14 Jan 2019 00:00) (18 - 19)  SpO2: --          DVT ppx : asa     MEDICATIONS  (STANDING):  amLODIPine   Tablet 10 milliGRAM(s) Oral daily  aspirin enteric coated 325 milliGRAM(s) Oral every 12 hours  celecoxib 200 milliGRAM(s) Oral every 12 hours  docusate sodium 100 milliGRAM(s) Oral three times a day  ferrous    sulfate 325 milliGRAM(s) Oral three times a day with meals  losartan 100 milliGRAM(s) Oral daily  multivitamin 1 Tablet(s) Oral daily  pantoprazole    Tablet 40 milliGRAM(s) Oral before breakfast  senna 1 Tablet(s) Oral two times a day  sodium chloride 0.9%. 1000 milliLiter(s) (100 mL/Hr) IV Continuous <Continuous>    MEDICATIONS  (PRN):  bisacodyl Suppository 10 milliGRAM(s) Rectal daily PRN If no bowel movement  chlorhexidine 4% Liquid 1 Application(s) Topical <User Schedule> PRN prn incontinence  morphine IVPB 2 milliGRAM(s) IV Intermittent every 4 hours PRN Severe Pain (7 - 10)  ondansetron Injectable 4 milliGRAM(s) IV Push every 6 hours PRN Nausea and/or Vomiting  oxyCODONE    IR 10 milliGRAM(s) Oral every 4 hours PRN Moderate Pain (4 - 6)  oxyCODONE    IR 5 milliGRAM(s) Oral every 4 hours PRN Mild Pain (1 - 3)      PE:   right knee surgical site c/d           Compartments soft         NVI, SILT           A/P: 63yMale POD # 4   S/P right tka revision            OOB to Chair            Physical Therapy           Pain control            Incentive Spirometry            DVT Prophylaxis            Discharge planning for home

## 2019-01-15 ENCOUNTER — TRANSCRIPTION ENCOUNTER (OUTPATIENT)
Age: 64
End: 2019-01-15

## 2019-01-15 VITALS
TEMPERATURE: 98 F | SYSTOLIC BLOOD PRESSURE: 106 MMHG | DIASTOLIC BLOOD PRESSURE: 84 MMHG | HEART RATE: 80 BPM | RESPIRATION RATE: 18 BRPM

## 2019-01-15 PROBLEM — I10 ESSENTIAL (PRIMARY) HYPERTENSION: Chronic | Status: ACTIVE | Noted: 2018-12-20

## 2019-01-15 PROBLEM — M19.90 UNSPECIFIED OSTEOARTHRITIS, UNSPECIFIED SITE: Chronic | Status: ACTIVE | Noted: 2018-12-20

## 2019-01-15 PROBLEM — E66.9 OBESITY, UNSPECIFIED: Chronic | Status: ACTIVE | Noted: 2018-12-20

## 2019-01-15 PROBLEM — G47.33 OBSTRUCTIVE SLEEP APNEA (ADULT) (PEDIATRIC): Chronic | Status: ACTIVE | Noted: 2018-12-20

## 2019-01-15 RX ORDER — OXYCODONE HYDROCHLORIDE 5 MG/1
1 TABLET ORAL
Qty: 42 | Refills: 0 | OUTPATIENT
Start: 2019-01-15 | End: 2019-01-21

## 2019-01-15 RX ORDER — ASPIRIN/CALCIUM CARB/MAGNESIUM 324 MG
1 TABLET ORAL
Qty: 60 | Refills: 0 | OUTPATIENT
Start: 2019-01-15 | End: 2019-02-13

## 2019-01-15 RX ORDER — ASPIRIN/CALCIUM CARB/MAGNESIUM 324 MG
1 TABLET ORAL
Qty: 60 | Refills: 0
Start: 2019-01-15 | End: 2019-02-13

## 2019-01-15 RX ORDER — OXYCODONE HYDROCHLORIDE 5 MG/1
1 TABLET ORAL
Qty: 42 | Refills: 0
Start: 2019-01-15 | End: 2019-01-21

## 2019-01-15 RX ADMIN — Medication 1 TABLET(S): at 11:59

## 2019-01-15 RX ADMIN — Medication 325 MILLIGRAM(S): at 11:59

## 2019-01-15 RX ADMIN — OXYCODONE HYDROCHLORIDE 10 MILLIGRAM(S): 5 TABLET ORAL at 06:45

## 2019-01-15 RX ADMIN — OXYCODONE HYDROCHLORIDE 10 MILLIGRAM(S): 5 TABLET ORAL at 10:06

## 2019-01-15 RX ADMIN — SENNA PLUS 1 TABLET(S): 8.6 TABLET ORAL at 06:14

## 2019-01-15 RX ADMIN — Medication 100 MILLIGRAM(S): at 06:13

## 2019-01-15 RX ADMIN — PANTOPRAZOLE SODIUM 40 MILLIGRAM(S): 20 TABLET, DELAYED RELEASE ORAL at 06:14

## 2019-01-15 RX ADMIN — CELECOXIB 200 MILLIGRAM(S): 200 CAPSULE ORAL at 06:13

## 2019-01-15 RX ADMIN — OXYCODONE HYDROCHLORIDE 10 MILLIGRAM(S): 5 TABLET ORAL at 06:14

## 2019-01-15 RX ADMIN — Medication 325 MILLIGRAM(S): at 06:14

## 2019-01-15 RX ADMIN — Medication 325 MILLIGRAM(S): at 09:06

## 2019-01-15 RX ADMIN — AMLODIPINE BESYLATE 10 MILLIGRAM(S): 2.5 TABLET ORAL at 06:14

## 2019-01-15 RX ADMIN — OXYCODONE HYDROCHLORIDE 10 MILLIGRAM(S): 5 TABLET ORAL at 14:43

## 2019-01-15 RX ADMIN — LOSARTAN POTASSIUM 100 MILLIGRAM(S): 100 TABLET, FILM COATED ORAL at 06:14

## 2019-01-15 NOTE — DISCHARGE NOTE ADULT - PATIENT PORTAL LINK FT
You can access the [facility] Patient Portal, offered by [facility], by registering with the following website: [email or web address]

## 2019-01-15 NOTE — DISCHARGE NOTE ADULT - CARE PROVIDER_API CALL
Damian Webster), Orthopaedic Surgery  [address] [place] [address]  Phone: [phone]  Fax: [phone]

## 2019-01-15 NOTE — DISCHARGE NOTE ADULT - PLAN OF CARE
return to previous level of independence keep surgical site clean and dry, may remove dressing in 3 days . Call surgeon if any wound drainage, redness , increasing pain, fevers over 101 or if you have any questions or concerns.

## 2019-01-15 NOTE — PROGRESS NOTE ADULT - SUBJECTIVE AND OBJECTIVE BOX
ORTHO PROGRESS NOTE       63yMale POD #  5    S/P right tka revision     Patient seen and examined at bedside . The patient is awake and alert in NAD. No complaints of chest pain, SOB, N/V.    Vital Signs Last 24 Hrs  T(C): 36.2 (15 Garret 2019 08:00), Max: 36.4 (14 Jan 2019 15:30)  T(F): 97.2 (15 Garret 2019 08:00), Max: 97.5 (14 Jan 2019 15:30)  HR: 73 (15 Garret 2019 08:00) (70 - 80)  BP: 148/70 (15 Garret 2019 08:00) (133/62 - 159/72)  BP(mean): --  RR: 18 (15 Garret 2019 08:00) (18 - 20)  SpO2: --        PE:   right knee surgical site c/d /i         Compartments soft         NVI, SILT           A/P: 63yMale POD # 5   S/P right tka revision            OOB to Chair            Physical Therapy           Pain control            Incentive Spirometry            DVT Prophylaxis            Discharge planning for home today

## 2019-01-15 NOTE — DISCHARGE NOTE ADULT - CARE PLAN
Principal Discharge DX:	OA (osteoarthritis)  Goal:	return to previous level of independence  Assessment and plan of treatment:	keep surgical site clean and dry, may remove dressing in 3 days . Call surgeon if any wound drainage, redness , increasing pain, fevers over 101 or if you have any questions or concerns.

## 2019-01-15 NOTE — DISCHARGE NOTE ADULT - MEDICATION SUMMARY - MEDICATIONS TO TAKE
I will START or STAY ON the medications listed below when I get home from the hospital:    oxyCODONE 5 mg oral tablet  -- 1 tab(s) by mouth every 4 hours, As Needed -for moderate pain MDD:6  -- Indication: For pain    aspirin 325 mg oral delayed release tablet  -- 1 tab(s) by mouth every 12 hours  -- Indication: For dvt prevention     losartan 100 mg oral tablet  -- 1 tab(s) by mouth once a day  -- Indication: For home med    amLODIPine 10 mg oral tablet  -- 1 tab(s) by mouth once a day  -- Indication: For home med

## 2019-01-23 ENCOUNTER — APPOINTMENT (OUTPATIENT)
Dept: ORTHOPEDIC SURGERY | Facility: CLINIC | Age: 64
End: 2019-01-23
Payer: OTHER MISCELLANEOUS

## 2019-01-23 DIAGNOSIS — Z96.651 PRESENCE OF RIGHT ARTIFICIAL KNEE JOINT: ICD-10-CM

## 2019-01-23 PROCEDURE — 99024 POSTOP FOLLOW-UP VISIT: CPT

## 2019-01-23 NOTE — ASSESSMENT
[FreeTextEntry1] : Patient comes in today status post right total knee revision. His wound is clean and dry Staples were removed benzoin and steri. Patient will followup in 4 weeks to

## 2019-02-12 ENCOUNTER — APPOINTMENT (OUTPATIENT)
Dept: SURGERY | Facility: CLINIC | Age: 64
End: 2019-02-12
Payer: COMMERCIAL

## 2019-02-12 VITALS — HEIGHT: 73 IN | WEIGHT: 295 LBS | BODY MASS INDEX: 39.1 KG/M2

## 2019-02-12 DIAGNOSIS — M62.08 SEPARATION OF MUSCLE (NONTRAUMATIC), OTHER SITE: ICD-10-CM

## 2019-02-12 PROCEDURE — 99243 OFF/OP CNSLTJ NEW/EST LOW 30: CPT

## 2019-02-12 NOTE — PHYSICAL EXAM
[Normal Breath Sounds] : Normal breath sounds [No Rash or Lesion] : No rash or lesion [Alert] : alert [Calm] : calm [JVD] : no jugular venous distention  [de-identified] : overweight [de-identified] : normal [de-identified] : protuberant abdomen, diastasis recti\par  [de-identified] : . [de-identified] : incarcerated umbilical hernia

## 2019-02-12 NOTE — ASSESSMENT
[FreeTextEntry1] : Smith is a pleasant  for the [employer] [place] [employer] with a past medical history significant for hypertension and hypercholesterolemia and arthritis along with multiple hip and knee surgeries in the past who presents to the office with concerns about intermittent pain and discomfort in the periumbilical region and a long asymptomatic bulge in the upper abdomen both noticed at his recent presurgical testing appointment for his recent right knee revision with Dr. Webster. He does do a moderate amount of physical activity and climbing ladders at work, and he enjoys lifting weights at the gym.\par \par Physical examination demonstrates a large 3 fingerbreadth wide diastasis recti most likely associated with his excess abdominal weight with no evidence of a true ventral hernia. His current BMI is 39. He does have a moderate size tender irreducible strawberry size bulge at the umbilicus itself which is consistent with an incarcerated umbilical hernia warranting surgical repair. There is no evidence of strangulation and the patient denies any symptoms of obstruction.\par \par I explained the pros and cons of surgery, as well as all risks, benefits, indications and alternatives of the procedure and the patient understood and agreed. The patient would like this done as soon as possible in light of progressively worsening symptoms and to limit his time off from work as he is already out of work due to his recent knee surgery. Smith was scheduled for the repair of his incarcerated umbilical hernia with mesh on Wednesday, February 20, 2019 under local with IV sedation at [facility] with presurgical testing waived.  The patient was encouraged to avoid heavy lifting and strenuous activity in the interim, of course. Of note, he had pretesting for his recent right knee revision in early January 2019.

## 2019-02-12 NOTE — CONSULT LETTER
[Dear  ___] : Dear  [unfilled], [Courtesy Letter:] : I had the pleasure of seeing your patient, [unfilled], in my office today. [Please see my note below.] : Please see my note below. [Consult Closing:] : Thank you very much for allowing me to participate in the care of this patient.  If you have any questions, please do not hesitate to contact me. [FreeTextEntry3] : Respectfully,\par \par Valentin Hoang M.D., FACS\par

## 2019-02-20 ENCOUNTER — APPOINTMENT (OUTPATIENT)
Dept: SURGERY | Facility: AMBULATORY SURGERY CENTER | Age: 64
End: 2019-02-20
Payer: COMMERCIAL

## 2019-02-20 ENCOUNTER — OUTPATIENT (OUTPATIENT)
Dept: OUTPATIENT SERVICES | Facility: HOSPITAL | Age: 64
LOS: 1 days | Discharge: HOME | End: 2019-02-20

## 2019-02-20 VITALS
OXYGEN SATURATION: 99 % | RESPIRATION RATE: 18 BRPM | WEIGHT: 299.83 LBS | TEMPERATURE: 98 F | SYSTOLIC BLOOD PRESSURE: 131 MMHG | HEART RATE: 69 BPM | HEIGHT: 73 IN | DIASTOLIC BLOOD PRESSURE: 63 MMHG

## 2019-02-20 VITALS
HEART RATE: 70 BPM | RESPIRATION RATE: 12 BRPM | SYSTOLIC BLOOD PRESSURE: 120 MMHG | DIASTOLIC BLOOD PRESSURE: 65 MMHG | OXYGEN SATURATION: 98 %

## 2019-02-20 DIAGNOSIS — Z98.890 OTHER SPECIFIED POSTPROCEDURAL STATES: Chronic | ICD-10-CM

## 2019-02-20 PROCEDURE — 49587: CPT

## 2019-02-20 RX ORDER — TRAMADOL HYDROCHLORIDE 50 MG/1
1 TABLET ORAL
Qty: 30 | Refills: 0
Start: 2019-02-20 | End: 2019-02-24

## 2019-02-20 RX ORDER — MORPHINE SULFATE 50 MG/1
2 CAPSULE, EXTENDED RELEASE ORAL
Qty: 0 | Refills: 0 | Status: DISCONTINUED | OUTPATIENT
Start: 2019-02-20 | End: 2019-02-20

## 2019-02-20 RX ORDER — OXYCODONE AND ACETAMINOPHEN 5; 325 MG/1; MG/1
1 TABLET ORAL ONCE
Qty: 0 | Refills: 0 | Status: DISCONTINUED | OUTPATIENT
Start: 2019-02-20 | End: 2019-02-20

## 2019-02-20 RX ORDER — SODIUM CHLORIDE 9 MG/ML
1000 INJECTION, SOLUTION INTRAVENOUS
Qty: 0 | Refills: 0 | Status: DISCONTINUED | OUTPATIENT
Start: 2019-02-20 | End: 2019-03-07

## 2019-02-20 RX ORDER — ONDANSETRON 8 MG/1
4 TABLET, FILM COATED ORAL ONCE
Qty: 0 | Refills: 0 | Status: DISCONTINUED | OUTPATIENT
Start: 2019-02-20 | End: 2019-03-07

## 2019-02-20 RX ORDER — HYDROMORPHONE HYDROCHLORIDE 2 MG/ML
0.5 INJECTION INTRAMUSCULAR; INTRAVENOUS; SUBCUTANEOUS
Qty: 0 | Refills: 0 | Status: DISCONTINUED | OUTPATIENT
Start: 2019-02-20 | End: 2019-02-20

## 2019-02-20 RX ADMIN — SODIUM CHLORIDE 100 MILLILITER(S): 9 INJECTION, SOLUTION INTRAVENOUS at 15:54

## 2019-02-20 NOTE — BRIEF OPERATIVE NOTE - PROCEDURE
<<-----Click on this checkbox to enter Procedure Umbilical hernia repair with mesh  02/20/2019  Incarcerated  Active  Valentin Hoang

## 2019-02-24 DIAGNOSIS — M19.90 UNSPECIFIED OSTEOARTHRITIS, UNSPECIFIED SITE: ICD-10-CM

## 2019-02-24 DIAGNOSIS — K42.0 UMBILICAL HERNIA WITH OBSTRUCTION, WITHOUT GANGRENE: ICD-10-CM

## 2019-02-24 DIAGNOSIS — I10 ESSENTIAL (PRIMARY) HYPERTENSION: ICD-10-CM

## 2019-02-25 ENCOUNTER — APPOINTMENT (OUTPATIENT)
Dept: ORTHOPEDIC SURGERY | Facility: CLINIC | Age: 64
End: 2019-02-25
Payer: OTHER MISCELLANEOUS

## 2019-02-25 DIAGNOSIS — Z78.9 OTHER SPECIFIED HEALTH STATUS: ICD-10-CM

## 2019-02-25 PROCEDURE — 73502 X-RAY EXAM HIP UNI 2-3 VIEWS: CPT | Mod: RT

## 2019-02-25 PROCEDURE — 73562 X-RAY EXAM OF KNEE 3: CPT | Mod: RT

## 2019-02-25 PROCEDURE — 99024 POSTOP FOLLOW-UP VISIT: CPT

## 2019-02-25 NOTE — REASON FOR VISIT
[Follow-Up Visit] : a follow-up visit for [Artificial Knee Joint] : artificial knee joint [FreeTextEntry2] : Right

## 2019-02-25 NOTE — ASSESSMENT
[FreeTextEntry1] : Patient comes in today approximately 6 weeks status post revision right total knee replacement. The knee itself feels great he has a complaint though of some recent numbness which developed along the lateral right thigh from the hip down to the knee. This started only recently about a month after surgery. I explained to him that this is unusual and does not sound to be related to the operation. I did a thorough neuromuscular exam of the right lower limb and found no deficits other than diminished sensation along the lateral aspect of the right thigh.\par The exam of the knee itself demonstrated a well-healed midline incision with range of motion of 0-100° with good stability radiographs please refer imaging impression patient doing well status post right revision knee replacement with numbness along the right thigh of unknown etiology plan the patient was given the name of Dr. Lopez a local neurologist that he should followup with him he can come back to see me in 6 day weeks' time for followup of the knee replacement

## 2019-02-25 NOTE — PHYSICAL EXAM
[2+] : left 2+ [Normal] : Alert and in no acute distress [de-identified] : Full ROM about the right knee with flexion. Mildly decreased ROM with extension about the right knee. No pain with ROM.  [de-identified] : Sensation grossly intact below the right knee, loss of sensation above the right knee [de-identified] : Incision well healed, no drainage or erythema. Non tender to palpation. Swelling noted about the right lower extremity.  [de-identified] : Radiographs done today on February 25, 2019 AP pelvis and lateral right hip AP lateral and skyline the right knee demonstrate a bony structures to be intact no fractures or dislocations. There is no evidence of arthritis of the of the right hip. The joint space is well maintained in the right hip joint. There is a well aligned revision femoral component in the right knee the femoral component is cemented with a long press-fit stem and the original tibial component is in place well aligned and well fixed impression minimal to no evidence of arthritis in the right hip and a stable right revision knee replacement.

## 2019-02-25 NOTE — HISTORY OF PRESENT ILLNESS
[de-identified] : 63 year old male s/p revision of right total knee replacement presents to the office today for his 6 week follow up. Pt reports doing well in regard to his right knee. However, patient reports feeling a numbness sensation from the right hip down to the right knee. Pt reports the numbness was intermittent, but in the past week it has become constant. Pt is s/p hernia repair x5 days.

## 2019-02-25 NOTE — REVIEW OF SYSTEMS
[Joint Swelling] : joint swelling [Negative] : Heme/Lymph [Arthralgia] : no arthralgia [Joint Pain] : no joint pain [Joint Stiffness] : no joint stiffness

## 2019-02-28 ENCOUNTER — APPOINTMENT (OUTPATIENT)
Dept: SURGERY | Facility: CLINIC | Age: 64
End: 2019-02-28
Payer: COMMERCIAL

## 2019-02-28 DIAGNOSIS — K42.0 UMBILICAL HERNIA WITH OBSTRUCTION, W/OUT GANGRENE: ICD-10-CM

## 2019-02-28 PROCEDURE — 99024 POSTOP FOLLOW-UP VISIT: CPT

## 2019-02-28 NOTE — CONSULT LETTER
[FreeTextEntry1] : Dear Dr. Shay Grove, \par \par I had the pleasure of seeing your patient, DAMIEN DAS, in my office today. Please see my note below. \par \par Thank you very much for allowing me to participate in the care of this patient. If you have any questions, please do not hesitate to contact me. \par \par \par Respectfully,\par \par Valentin Hoang M.D., FACS\par

## 2019-02-28 NOTE — ASSESSMENT
[FreeTextEntry1] : Smith underwent the repair of his incarcerated umbilical hernia with mesh on February 20, 2019 under local with IV sedation without any problems or complications. His wound is clean, dry and intact. There is no evidence of erythema, seroma formation or infection. He is tolerating a diet and having normal bowel movements. He denies any significant postoperative pain or discomfort at this time, and he did not take any narcotic medication after his procedure.\par \par He was counseled and reassured. He was discharged from the office with no specific followup necessary, but he knows to avoid any heavy lifting or strenuous activity for the next several weeks. We also discussed the importance of calorie restriction and healthy eating with regard to weight loss, hernia recurrence and one's overall health. The patient was encouraged to continue to wear an abdominal binder for the better part of the next month.

## 2019-04-10 ENCOUNTER — APPOINTMENT (OUTPATIENT)
Dept: ORTHOPEDIC SURGERY | Facility: CLINIC | Age: 64
End: 2019-04-10
Payer: OTHER MISCELLANEOUS

## 2019-04-10 PROCEDURE — 99024 POSTOP FOLLOW-UP VISIT: CPT

## 2019-04-10 PROCEDURE — 73562 X-RAY EXAM OF KNEE 3: CPT | Mod: RT

## 2019-04-10 NOTE — ASSESSMENT
[FreeTextEntry1] : The patient comes in 3 months status post revision total knee replacement. He's doing well his pain is improved every day he gets a little stronger with improving strength and range of motion of the knee today he has a well-healed midline incision full extension flexion to 100° with good stability through throughout range of motion. Patient is to continue physical therapy walking on his own I will see him back in 3 months time

## 2019-04-10 NOTE — PHYSICAL EXAM
[de-identified] : Radiographs on April 10, 2019 AP lateral and skyline views of the right knee demonstrate a revision femoral component with a press-fit stem and a cemented primary tibial base plate impression stable knee prosthesis

## 2019-04-10 NOTE — HISTORY OF PRESENT ILLNESS
[de-identified] : 63 year old male s/p revision of right total knee replacement presents to the office today for his 3 month follow up.

## 2019-05-09 ENCOUNTER — OUTPATIENT (OUTPATIENT)
Dept: OUTPATIENT SERVICES | Facility: HOSPITAL | Age: 64
LOS: 1 days | Discharge: HOME | End: 2019-05-09

## 2019-05-09 DIAGNOSIS — Z96.651 PRESENCE OF RIGHT ARTIFICIAL KNEE JOINT: ICD-10-CM

## 2019-05-09 DIAGNOSIS — Z98.890 OTHER SPECIFIED POSTPROCEDURAL STATES: Chronic | ICD-10-CM

## 2019-05-10 ENCOUNTER — APPOINTMENT (OUTPATIENT)
Dept: ORTHOPEDIC SURGERY | Facility: CLINIC | Age: 64
End: 2019-05-10
Payer: COMMERCIAL

## 2019-05-10 PROCEDURE — 20610 DRAIN/INJ JOINT/BURSA W/O US: CPT | Mod: LT

## 2019-05-10 PROCEDURE — 99213 OFFICE O/P EST LOW 20 MIN: CPT | Mod: 25

## 2019-05-13 NOTE — ASSESSMENT
[FreeTextEntry1] : Discussed at length with the patient the planned steroid and lidocaine injection. The risks, benefits, convalescence and alternatives were reviewed. The possible side effects discussed included but were not limited to: pain, swelling, heat and redness. There symptoms are generally mild but if they are extensive then contact the office. Giving pain relievers by mouth such as NSAID’s or Tylenol can generally treat the reactions to steroid and lidocaine. Rare cases of infection have been noted. Rash, hives and itching may occur post injection. If you have muscle pain or cramps, flushing and or swelling of the face, rapid heart beat, nausea, dizziness, fever, chills, headache, difficulty breathing, swelling in the arms or legs, or have a prickly feeling of your skin, contact a health care provider immediately.\par \par Following this discussion, the /LEFT knee was prepped with betadine and under sterile conditions 4 cc of 1% lidocaine and 1 ml Kenalog were injected with a 21 gauge needle. The needle was introduced into the joint, aspiration was performed to ensure intra-articular placement and the medication was injected. Upon withdrawal of the needle the site was cleaned with alcohol and a bandaid applied. The patient tolerated the injection well and there were no adverse effects. Post injection instructions included no strenuous activity for 24 hours, cryotherapy and if there are any adverse effects to contact the office.\par

## 2019-06-03 ENCOUNTER — APPOINTMENT (OUTPATIENT)
Dept: ORTHOPEDIC SURGERY | Facility: CLINIC | Age: 64
End: 2019-06-03
Payer: OTHER MISCELLANEOUS

## 2019-06-03 ENCOUNTER — APPOINTMENT (OUTPATIENT)
Dept: ORTHOPEDIC SURGERY | Facility: CLINIC | Age: 64
End: 2019-06-03

## 2019-06-03 PROCEDURE — 73502 X-RAY EXAM HIP UNI 2-3 VIEWS: CPT | Mod: RT

## 2019-06-03 PROCEDURE — 99214 OFFICE O/P EST MOD 30 MIN: CPT

## 2019-06-03 PROCEDURE — 73564 X-RAY EXAM KNEE 4 OR MORE: CPT | Mod: 50

## 2019-06-03 NOTE — REASON FOR VISIT
[Follow-Up Visit] : a follow-up visit for [Hip Pain] : hip pain [Artificial Knee Joint] : artificial knee joint [Knee Pain] : knee pain

## 2019-06-04 NOTE — PHYSICAL EXAM
[de-identified] : General appearance: well nourished and hydrated, pleasant, alert and oriented x 3, cooperative.\par Cardiovascular: no apparent abnormalities, no lower leg edema, no varicosities, pedal pulses are palpable.\par Neurologic: sensation is normal, no muscle weakness in upper or lower extremities.\par Gait: nonantalgic.\par \par Left knee\par Inspection: no effusion or erythema.\par Wounds: none.\par Alignment: normal.\par Palpation: no specific tenderness on palpation.\par ROM: Full active and passive ROM with flexion and extension. No pain with ROM. \par Muscle Test: good quad strength.\par \par Right Knee\par Inspection: no effusion\par Wounds: healed midline incision\par Alignment: normal.\par Palpation: Tender to palpation over the medial aspect of the right knee\par ROM: Full active and passive ROM with flexion and extension. Pain with extremes of flexion and extension.\par Muscle Test: good quad strength.\par Leg examination: calf is soft and non-tender.\par \par \par Right hip\par Inspection: No swelling or ecchymosis.\par Wounds: none.\par Palpation: non-tender.\par Stability: no instability.\par Strength: 5/5 all motor groups.\par ROM: Painful active and passive ROM. \par Leg length: equal.\par \par \par  [de-identified] : Radiographs from May 3, 2019 AP pelvis lateral of the right hip AP lateral skyline views of the right knee AP lateral skyline views of the left knee show the bony structures to be intact no fractures or dislocations the hips have well-maintained joint spaces with no evidence of arthritis the left knee has about 50% joint space narrowing of the medial compartment and the right knee demonstrates a revision femoral component on the primary tibial component though swollen and the revision femoral component has a press-fit stem and a cemented component impression normal AP the pelvis moderate S. urethritis the left knee and stable revision right total knee

## 2019-06-04 NOTE — HISTORY OF PRESENT ILLNESS
[de-identified] : 63 year old male s/p revision of right total knee replacement presents to the office today for a follow up and complaints of right hip pain. Pt was last here May 10 for a Kenalog injection, which he reports gave him good relief. He reports pain with ambulation and an occasional locking sensation. He denies taking anything for pain in regard to his right knee. Pt reports noticing pain in the right hip when laying in bed, long periods of standing, and ambulating on an incline. Pt states that his right hip pain comes and goes. Pt states that he had a plastic dhara due to torn cartilage in his hip and suffered from a labrum tear after a work injury in 2012.

## 2019-06-04 NOTE — ASSESSMENT
[FreeTextEntry1] : Patient presents today with multiple complaints he has intermittent right groin pain in the right hip. This is a hip that he had had a hip arthroscopy on in the past and he continues to have pain in the hip additionally his left knee which is known to be arthritic is giving him an activity related pain clearly this is something that will and come to surgery in the future with the amount of arthritis noted on his radiographs but of course the first treatment option would be conservative management. Patient was advised of weight reduction activity modification and he will trial a course of Mobic 15 mg p.o. once a day and we'll follow up regarding her knee in 1-2 months the right knee which is a revision that I did in January was doing well but now as he becomes more active he has feelings of giving way in the knee he does have a little more laxity than I would like to see and flexion and hopeful that with some deconditioning of the leg that this will become a nonissue. We're to continue the physical therapy to continue to strengthen the quads and hamstrings and he will take the Mobic for the pain is having in the left knee and the right hip we'll see him back in 1-2 months time

## 2019-07-10 ENCOUNTER — APPOINTMENT (OUTPATIENT)
Dept: ORTHOPEDIC SURGERY | Facility: CLINIC | Age: 64
End: 2019-07-10
Payer: OTHER MISCELLANEOUS

## 2019-07-10 PROCEDURE — 99213 OFFICE O/P EST LOW 20 MIN: CPT

## 2019-07-10 NOTE — PHYSICAL EXAM
[de-identified] : General appearance: well nourished and hydrated, pleasant, alert and oriented x 3, cooperative.\par Cardiovascular: no apparent abnormalities, no lower leg edema, no varicosities, pedal pulses are palpable.\par Neurologic: sensation is normal, no muscle weakness in upper or lower extremities.\par Gait: nonantalgic.\par \par Left knee\par Inspection: no effusion or erythema.\par Wounds: none.\par Alignment: normal.\par Palpation: no specific tenderness on palpation.\par ROM: Full active and passive ROM with flexion and extension. No pain with ROM. \par Muscle Test: good quad strength.\par \par Right Knee\par Inspection: no effusion\par Wounds: healed midline incision\par Alignment: normal.\par Palpation: Tender to palpation over the medial aspect of the right knee\par ROM: Full active and passive ROM with flexion and extension. Pain with extremes of flexion and extension.\par Muscle Test: good quad strength.\par Leg examination: calf is soft and non-tender.\par \par \par Right hip\par Inspection: No swelling or ecchymosis.\par Wounds: none.\par Palpation: non-tender.\par Stability: no instability.\par Strength: 5/5 all motor groups.\par ROM: Painful active and passive ROM. \par Leg length: equal.\par \par \par  [de-identified] : Radiographs from [place] 3, 2019 AP pelvis lateral of the right hip AP lateral skyline views of the right knee AP lateral skyline views of the left knee show the bony structures to be intact no fractures or dislocations the hips have well-maintained joint spaces with no evidence of arthritis the left knee has about 50% joint space narrowing of the medial compartment and the right knee demonstrates a revision femoral component on the primary tibial component though swollen and the revision femoral component has a press-fit stem and a cemented component impression normal AP the pelvis moderate S. urethritis the left knee and stable revision right total knee

## 2019-07-10 NOTE — ASSESSMENT
[FreeTextEntry1] : Pt comes in today for FU of his right revision of his knee. Pt continues to complain of pain both with ambulation and getting up from a chair. Pt also complains of pain over the anterior lateral right hip in the area of his arthroscopic hip portals. \par \par Examination of the knee reveals no evidence of infection, minimal swelling with excellent ROM and stability. I confirmed that at the time of the revision a constrained poly insert was used. \par \par My impression is that the pt's right knee pain is secondary to residual muscle weakness and that he would benefit from PT. We will continue to try to arrange this through workman's compensation. \par \par With respect to his hip pain we will attempt to refer him to Dr. Graff at [facility].

## 2019-08-05 ENCOUNTER — APPOINTMENT (OUTPATIENT)
Dept: ORTHOPEDIC SURGERY | Facility: CLINIC | Age: 64
End: 2019-08-05
Payer: COMMERCIAL

## 2019-08-05 PROCEDURE — 99213 OFFICE O/P EST LOW 20 MIN: CPT

## 2019-08-05 RX ORDER — ANASTROZOLE TABLETS 1 MG/1
1 TABLET ORAL
Qty: 15 | Refills: 0 | Status: DISCONTINUED | COMMUNITY
Start: 2019-02-01 | End: 2019-08-05

## 2019-08-05 RX ORDER — MELOXICAM 15 MG/1
15 TABLET ORAL
Qty: 60 | Refills: 0 | Status: DISCONTINUED | COMMUNITY
Start: 2019-06-03 | End: 2019-08-05

## 2019-08-05 RX ORDER — AMLODIPINE BESYLATE 10 MG/1
10 TABLET ORAL
Refills: 0 | Status: DISCONTINUED | COMMUNITY
Start: 2019-02-25 | End: 2019-08-05

## 2019-08-05 NOTE — ASSESSMENT
[FreeTextEntry1] : Pt continues to complain of pain in the right knee while negotiating stairs. His radiographs and physical exam reveal a stable well aligned knee replacement. My impression is that his difficulties are related to muscle weakness. We will continue to try and obtain authorization for physical therapy. Meanwhile his left knee is becoming symptomatic of moderate medial compartment arthritis. We will therefore start him on Celebrex for the problems he's having in both knees. F/u in 1 month.

## 2019-08-05 NOTE — PHYSICAL EXAM
[de-identified] : Left knee\par Inspection: no effusion or erythema.\par Wounds: none.\par Alignment: normal.\par Palpation: no specific tenderness on palpation.\par ROM: Full active and passive ROM with flexion and extension. No pain with ROM. \par Muscle Test: good quad strength.\par \par Right Knee\par Inspection: no effusion\par Wounds: healed midline incision\par Alignment: normal.\par Palpation: Tender to palpation over the medial aspect of the right knee\par ROM: Full active and passive ROM with flexion and extension. Pain with extremes of flexion and extension.\par Muscle Test: good quad strength.\par Leg examination: calf is soft and non-tender.\par \par  [de-identified] : Radiographs from [place] 3, 2019 AP pelvis lateral of the right hip AP lateral skyline views of the right knee AP lateral skyline views of the left knee show the bony structures to be intact no fractures or dislocations the hips have well-maintained joint spaces with no evidence of arthritis the left knee has about 50% joint space narrowing of the medial compartment and the right knee demonstrates a revision femoral component on the primary tibial component though swollen and the revision femoral component has a press-fit stem and a cemented component impression normal AP the pelvis moderate S. urethritis the left knee and stable revision right total knee

## 2019-09-13 ENCOUNTER — APPOINTMENT (OUTPATIENT)
Dept: ORTHOPEDIC SURGERY | Facility: CLINIC | Age: 64
End: 2019-09-13
Payer: OTHER MISCELLANEOUS

## 2019-09-13 PROCEDURE — 99213 OFFICE O/P EST LOW 20 MIN: CPT

## 2019-09-13 NOTE — REASON FOR VISIT
[Follow-Up Visit] : a follow-up visit for [Osteoarthritis, Knee] : osteoarthritis, knee [Artificial Knee Joint] : artificial knee joint

## 2019-09-13 NOTE — PHYSICAL EXAM
[de-identified] : Radiographs from [place] 3, 2019 AP pelvis lateral of the right hip AP lateral skyline views of the right knee AP lateral skyline views of the left knee show the bony structures to be intact no fractures or dislocations the hips have well-maintained joint spaces with no evidence of arthritis the left knee has about 50% joint space narrowing of the medial compartment and the right knee demonstrates a revision femoral component on the primary tibial component though swollen and the revision femoral component has a press-fit stem and a cemented component impression normal AP the pelvis moderate S. urethritis the left knee and stable revision right total knee

## 2019-09-13 NOTE — HISTORY OF PRESENT ILLNESS
[de-identified] : 63 year old male s/p revision of right total knee replacement and left knee arthritis presents to the office today for follow up after being advised to start physical therapy. Unfortunately, he did not start due to it not being approved, but he did go to see a neurologist and will be starting PT soon through her. Pt started celerbex when we last saw him, he reports some relief with that.

## 2019-09-13 NOTE — ASSESSMENT
[FreeTextEntry1] : S/p right revision total knee replacement, continues to complain of thigh numbness and weakness of the leg. Also complains of arthritic left knee. Pt saw neurologist regarding numbness of the thigh , she feels its related to the tourniquet and will slowly return with time. We are awaiting approval for PT. He will continue on the Celebrex for the left knee. F/u 3 months.

## 2019-12-11 ENCOUNTER — APPOINTMENT (OUTPATIENT)
Dept: ORTHOPEDIC SURGERY | Facility: CLINIC | Age: 64
End: 2019-12-11

## 2020-01-07 ENCOUNTER — APPOINTMENT (OUTPATIENT)
Dept: OTOLARYNGOLOGY | Facility: CLINIC | Age: 65
End: 2020-01-07

## 2020-01-08 ENCOUNTER — OUTPATIENT (OUTPATIENT)
Dept: OUTPATIENT SERVICES | Facility: HOSPITAL | Age: 65
LOS: 1 days | Discharge: HOME | End: 2020-01-08

## 2020-01-08 DIAGNOSIS — Z98.890 OTHER SPECIFIED POSTPROCEDURAL STATES: Chronic | ICD-10-CM

## 2020-01-08 DIAGNOSIS — M99.04 SEGMENTAL AND SOMATIC DYSFUNCTION OF SACRAL REGION: ICD-10-CM

## 2020-01-27 ENCOUNTER — APPOINTMENT (OUTPATIENT)
Dept: OTOLARYNGOLOGY | Facility: CLINIC | Age: 65
End: 2020-01-27

## 2020-08-26 NOTE — DISCHARGE NOTE ADULT - MEDICATION SUMMARY - MEDICATIONS TO STOP TAKING
CERTIFICATE OF WORK      August 26, 2020      Re: Phuc Barrett  [address] [facility] [id number]      This is to certify that Phuc Barrett has been under my care from 8/26/2020 and can return to light work on 8/27/2020    RESTRICTIONS: Patient is to be on light duty for 6 weeks then back to normal work duty after the 6 weeks complete.       REMARKS: Light work consists of no lifting >20 pounds, no lifting and carrying >10 pounds, limit bending, twisting at the waist, limit lifting objects above head.           SIGNATURE:___________________________________________          Alfred Jansen MD  [facility] [address] [facility] [address]  Dept Phone: [phone]    
I will STOP taking the medications listed below when I get home from the hospital:  None

## 2020-09-21 ENCOUNTER — APPOINTMENT (OUTPATIENT)
Dept: ORTHOPEDIC SURGERY | Facility: CLINIC | Age: 65
End: 2020-09-21
Payer: COMMERCIAL

## 2020-09-21 VITALS — TEMPERATURE: 98 F

## 2020-09-21 DIAGNOSIS — M25.561 PAIN IN RIGHT KNEE: ICD-10-CM

## 2020-09-21 PROCEDURE — 99214 OFFICE O/P EST MOD 30 MIN: CPT

## 2020-09-21 PROCEDURE — 73564 X-RAY EXAM KNEE 4 OR MORE: CPT | Mod: RT

## 2020-09-21 PROCEDURE — 73502 X-RAY EXAM HIP UNI 2-3 VIEWS: CPT | Mod: RT

## 2020-09-21 RX ORDER — CELECOXIB 200 MG/1
200 CAPSULE ORAL
Qty: 30 | Refills: 0 | Status: DISCONTINUED | COMMUNITY
Start: 2019-08-05 | End: 2020-09-21

## 2020-09-21 NOTE — ASSESSMENT
[FreeTextEntry1] : S/p RTK revision 1/2019. Post operatively pt had thigh numbness which was felt to be related to the tourniquet. He states that now this numbness is intermittent, it comes and goes. His biggest problem is pain around the knee itself which he experiences after walking a few blocks and at night. The pain shoots to the ankle. His radiographs show that the implants are well fixed, no evidence of loosening. His physical exam reveals knee to be stable with good ROM. For the sake of completeness we will order a ESR CRP to rule out infection. We will call the pt with results after we obtain them.

## 2020-09-21 NOTE — HISTORY OF PRESENT ILLNESS
[de-identified] : 64 year old male s/p revision right total knee replacement in January 2019 presents to the office today for a follow up. Patient continues to report pain in his right knee that is sharp and achy in nature. He feels things are getting progressively worse in regard to the right knee. Patient states the pain radiates down the leg from the knee to the ankle. Patient reports buckling and locking in his right knee. He reports being able to ambulate only 1-2 blocks. He reports difficulty with negotiating stairs, one step at a time worse with descending. Patient states pain is at its worst at night and with prolonged standing. Patient reports taking Tylenol and Advil for pain with some relief. Patient is here today to discuss his options for pain relief.

## 2020-09-21 NOTE — PHYSICAL EXAM
[de-identified] : Right knee\par Inspection: no effusion or erythema.\par Wounds: well healed incision \par Alignment: normal.\par Palpation: no specific tenderness on palpation.\par ROM: 0-100 degrees with less than 5mm of varus valgus laxity in flexion \par Ligamentous laxity: all ligaments appear \par Muscle Test: good quad strength.\par Leg examination: calf is soft and non-tender.\par  \par right hip- \par ROM- Flexion to 90, ER 30, ABD 30 [de-identified] : Radiographs done today AP lateral and skyline of the right knee shows a well aligned , well fixed cemented primary tibial base plate. There is a revision femoral component with a press fit stem. The cemented femoral component appears to be well fixed with no evidence of loosening. The patella is centrally tracking. \par \par Radiographs done today AP pelvis and lateral of the right hip shows the bony structures are intact , there is mild joint space narrowing of the right hip.

## 2021-01-20 NOTE — PHYSICAL THERAPY INITIAL EVALUATION ADULT - LEVEL OF INDEPENDENCE: STAND/SIT, REHAB EVAL
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 Pt instructed to stop picking. Detail Level: Detailed contact guard alert and oriented x 3/responds to pain/responds to verbal commands/sensation intact

## 2021-04-07 ENCOUNTER — APPOINTMENT (OUTPATIENT)
Dept: ORTHOPEDIC SURGERY | Facility: CLINIC | Age: 66
End: 2021-04-07
Payer: COMMERCIAL

## 2021-04-07 VITALS — TEMPERATURE: 97.8 F

## 2021-04-07 PROCEDURE — 99072 ADDL SUPL MATRL&STAF TM PHE: CPT

## 2021-04-07 PROCEDURE — 99214 OFFICE O/P EST MOD 30 MIN: CPT

## 2021-04-07 PROCEDURE — 73562 X-RAY EXAM OF KNEE 3: CPT | Mod: RT

## 2021-04-07 NOTE — PHYSICAL EXAM
[de-identified] : Right knee\par Inspection: no effusion or erythema.\par Wounds: well healed incision \par Alignment: normal.\par Palpation: no specific tenderness on palpation.\par ROM: 0-100 degrees, with 1-2mm of varus valgus laxity in full extension and 30 degrees of flexion. 3-5mm of varus valgus laxity with the knee in 90 degrees of flexion. \par Ligamentous laxity: all ligaments appear \par Muscle Test: good quad strength.\par Leg examination: calf is soft and non-tender.\par   [de-identified] : Radiographs done today AP lateral and skyline of the right knee shows well aligned, well fixed, cemented primary tibia and revision femur with hybrid fixation.

## 2021-04-07 NOTE — ASSESSMENT
[FreeTextEntry1] : S/p rev RTK 01/10/2019 , pt had done well initially after revision , but now complains of pain while walking in lateral mid thigh which radiates beyond the knee into the lateral calf. I explained to the pt his blood work shows no evidence of infection. Radiographs show implants to be well fixed with no evidence of loosening. And knee appears to be stable through ROM on examination. The fact that his pain extends well beyond the knee suggests that some of his pain is coming from LS spine. He is under the care of pain management for low back pain. Some of the pain could be coming from ITB and periarticular soft tissues. And for that we will send to PT.

## 2021-04-07 NOTE — HISTORY OF PRESENT ILLNESS
[de-identified] : 65 year old male s/p revision right total knee replacement presents to the office today for a follow up. Patient continues complaining of pain in the knee. He states that he feels the knee andrew a few times a month. Patient states he can only ambulate about one block and then needs rest due to the pain. He is currently taking Aleve for pain occasionally with only some relief. There is pain in the knee at night which he reports wakes him up. When we last saw him we obtained an ESR/CRP which came back within normal limits. Patient is here today to discuss his next options for pain relief.

## 2021-07-21 ENCOUNTER — APPOINTMENT (OUTPATIENT)
Dept: ORTHOPEDIC SURGERY | Facility: CLINIC | Age: 66
End: 2021-07-21
Payer: COMMERCIAL

## 2021-07-21 DIAGNOSIS — T84.84XA PAIN DUE TO INTERNAL ORTHOPEDIC PROSTHETIC DEVICES, IMPLANTS AND GRAFTS, INITIAL ENCOUNTER: ICD-10-CM

## 2021-07-21 PROCEDURE — 73564 X-RAY EXAM KNEE 4 OR MORE: CPT | Mod: RT

## 2021-07-21 PROCEDURE — 99214 OFFICE O/P EST MOD 30 MIN: CPT

## 2021-07-21 PROCEDURE — 73502 X-RAY EXAM HIP UNI 2-3 VIEWS: CPT | Mod: RT

## 2021-07-21 NOTE — ASSESSMENT
[FreeTextEntry1] : 04/07/2021- S/p rev RTK 01/10/2019 , pt had done well initially after revision , but now complains of pain while walking in lateral mid thigh which radiates beyond the knee into the lateral calf. I explained to the pt his blood work shows no evidence of infection. Radiographs show implants to be well fixed with no evidence of loosening. And knee appears to be stable through ROM on examination. The fact that his pain extends well beyond the knee suggests that some of his pain is coming from LS spine. He is under the care of pain management for low back pain. Some of the pain could be coming from ITB and periarticular soft tissues. And for that we will send to PT. \par \par 07/21/2021- Pt presents for follow up of his RTK, still complaining of primarily anterior knee pain with activities and at rest. For the sake of completeness we also x-rayed his hip which showed mild arthritic changes. This is an unlikely source of his arthritic knee pain. He has had extensive PT as well. I do not have a good explanation for his pain. He will f/u in 3 months time.

## 2021-07-21 NOTE — HISTORY OF PRESENT ILLNESS
[de-identified] : 4/7/2021 - 65 year old male s/p revision right total knee replacement presents to the office today for a follow up. Patient continues complaining of pain in the knee. He states that he feels the knee andrew a few times a month. Patient states he can only ambulate about one block and then needs rest due to the pain. He is currently taking Aleve for pain occasionally with only some relief. There is pain in the knee at night which he reports wakes him up. When we last saw him we obtained an ESR/CRP which came back within normal limits. Patient is here today to discuss his next options for pain relief.\par \par 7/21/2021 - 65 year old male s/p revision right total knee replacement presents to the office today for a follow up. He reports doing PT since we last saw him but states he did it for 12 weeks without relief. Pt states in the last week his knee has buckled twice. While he reports having pain, he denies taking anything for it.

## 2021-07-21 NOTE — PHYSICAL EXAM
[de-identified] : Right knee\par Inspection: no effusion or erythema.\par Wounds: well healed incision \par Alignment: normal.\par Palpation: no specific tenderness on palpation.\par ROM: 0-100 degrees, with 1-2mm of varus valgus laxity in full extension and 30 degrees of flexion. 3-5mm of varus valgus laxity with the knee in 90 degrees of flexion. \par Ligamentous laxity: all ligaments appear \par Muscle Test: good quad strength.\par Leg examination: calf is soft and non-tender.\par   [de-identified] : 04/07/2021- Radiographs done today AP lateral and skyline of the right knee shows well aligned, well fixed, cemented primary tibia and revision femur with hybrid fixation. \par \par 07/21/2021- Radiographs done today AP pelvis and lateral of both hips shows mild degenerative changes.

## 2021-10-20 ENCOUNTER — APPOINTMENT (OUTPATIENT)
Dept: ORTHOPEDIC SURGERY | Facility: CLINIC | Age: 66
End: 2021-10-20
Payer: COMMERCIAL

## 2021-10-20 VITALS — TEMPERATURE: 97.9 F

## 2021-10-20 DIAGNOSIS — M25.551 PAIN IN RIGHT HIP: ICD-10-CM

## 2021-10-20 PROCEDURE — 73562 X-RAY EXAM OF KNEE 3: CPT | Mod: LT

## 2021-10-20 PROCEDURE — 99213 OFFICE O/P EST LOW 20 MIN: CPT

## 2021-10-20 NOTE — HISTORY OF PRESENT ILLNESS
[de-identified] : 4/7/2021 - 65 year old male s/p revision right total knee replacement presents to the office today for a follow up. Patient continues complaining of pain in the knee. He states that he feels the knee andrew a few times a month. Patient states he can only ambulate about one block and then needs rest due to the pain. He is currently taking Aleve for pain occasionally with only some relief. There is pain in the knee at night which he reports wakes him up. When we last saw him we obtained an ESR/CRP which came back within normal limits. Patient is here today to discuss his next options for pain relief.\par \par 7/21/2021 - 65 year old male s/p revision right total knee replacement presents to the office today for a follow up. He reports doing PT since we last saw him but states he did it for 12 weeks without relief. Pt states in the last week his knee has buckled twice. While he reports having pain, he denies taking anything for it. \par \par 10/20/2021 - 65 year old male s/p revision right total knee replacement presents to the office today for a follow up.

## 2021-10-20 NOTE — PHYSICAL EXAM
[de-identified] : Right knee\par Inspection: no effusion or erythema.\par Wounds: well healed incision \par Alignment: normal.\par Palpation: no specific tenderness on palpation.\par ROM: 0-100 degrees, with 1-2mm of varus valgus laxity in full extension and 30 degrees of flexion. 3-5mm of varus valgus laxity with the knee in 90 degrees of flexion. \par Ligamentous laxity: all ligaments appear \par Muscle Test: good quad strength.\par Leg examination: calf is soft and non-tender.\par  \par Left hip\par Inspection: No swelling or ecchymosis.\par Wounds: none.\par Palpation: non-tender.\par Stability: no instability.\par Strength: 5/5 all motor groups.\par ROM: no pain with FROM\par Leg length: equal.\par \par Right hip\par Inspection: No swelling or ecchymosis.\par Wounds: none.\par Palpation: non-tender.\par Stability: no instability.\par Strength: 5/5 all motor groups.\par ROM: No pain with range of motion\par Leg length: equal. [de-identified] : Radiographs done today AP lateral and skyline of the right knee shows well aligned, well fixed, cemented primary tibia and revision femur with hybrid fixation. Left knee shows 50% joint space narrowing in the medial compartment of the left knee. \par \par 07/21/2021- Radiographs done today AP pelvis and lateral of both hips shows mild degenerative changes.

## 2021-10-20 NOTE — ASSESSMENT
[FreeTextEntry1] : 04/07/2021- S/p rev RTK 01/10/2019 , pt had done well initially after revision , but now complains of pain while walking in lateral mid thigh which radiates beyond the knee into the lateral calf. I explained to the pt his blood work shows no evidence of infection. Radiographs show implants to be well fixed with no evidence of loosening. And knee appears to be stable through ROM on examination. The fact that his pain extends well beyond the knee suggests that some of his pain is coming from LS spine. He is under the care of pain management for low back pain. Some of the pain could be coming from ITB and periarticular soft tissues. And for that we will send to PT. \par \par 07/21/2021- Pt presents for follow up of his RTK, still complaining of primarily anterior knee pain with activities and at rest. For the sake of completeness we also x-rayed his hip which showed mild arthritic changes. This is an unlikely source of his arthritic knee pain. He has had extensive PT as well. I do not have a good explanation for his pain. He will f/u in 3 months time. \par \par 10/20/2021- Patient is here for follow up of his right revision total knee. He still has complaints of periarticular pain that additionally has, I believe, pain which may be referred from his right hip and right lower lumbar area. His knee exam today demonstrates excellent ROM and stability. His right hip exam is also benign today, but he does have a history of a previous hip scope with labral repair. In reviewing his old hip xrays show a cyst in the femoral head. We will observe this going forward. In the meantime the patient is encouraged to be active and walk as much as possible. He compliains of medial sided left knee pain, he has 50% loss of the medial joint space on the left knee. He has medial compartment arthritis. We will order gel injections for the left knee.

## 2021-11-08 ENCOUNTER — APPOINTMENT (OUTPATIENT)
Dept: ORTHOPEDIC SURGERY | Facility: CLINIC | Age: 66
End: 2021-11-08
Payer: COMMERCIAL

## 2021-11-08 PROCEDURE — 20610 DRAIN/INJ JOINT/BURSA W/O US: CPT | Mod: LT

## 2021-11-08 RX ORDER — HYALURONATE SODIUM 20 MG/2 ML
20 SYRINGE (ML) INTRAARTICULAR
Refills: 0 | Status: COMPLETED | OUTPATIENT
Start: 2021-11-08

## 2021-11-08 RX ADMIN — Medication 2 MG/2ML: at 00:00

## 2021-11-08 NOTE — HISTORY OF PRESENT ILLNESS
[de-identified] : 65 year old male presents to the office today for a Euflexxa injection into his arthritic left knee.

## 2021-11-17 ENCOUNTER — APPOINTMENT (OUTPATIENT)
Dept: ORTHOPEDIC SURGERY | Facility: CLINIC | Age: 66
End: 2021-11-17
Payer: COMMERCIAL

## 2021-11-17 PROCEDURE — 20610 DRAIN/INJ JOINT/BURSA W/O US: CPT | Mod: LT

## 2021-11-17 RX ORDER — HYALURONATE SODIUM 20 MG/2 ML
20 SYRINGE (ML) INTRAARTICULAR
Refills: 0 | Status: COMPLETED | OUTPATIENT
Start: 2021-11-17

## 2021-11-17 RX ADMIN — Medication 2 MG/2ML: at 00:00

## 2021-11-17 NOTE — ASSESSMENT
[FreeTextEntry1] : Discussed at length with the patient the planned Euflexxa injection. The risks, benefits, convalescence and alternatives were reviewed. The possible side effects discussed included but were not limited to: pain, swelling, heat and redness. There symptoms are generally mild but if they are extensive then contact the office. Giving pain relievers by mouth such as NSAID’s or Tylenol can generally treat the reactions to steroid and lidocaine. Rare cases of infection have been noted. Rash, hives and itching may occur post injection. If you have muscle pain or cramps, flushing and or swelling of the face, rapid heart beat, nausea, dizziness, fever, chills, headache, difficulty breathing, swelling in the arms or legs, or have a prickly feeling of your skin, contact a health care provider immediately.\par \par Following this discussion, the LEFT knee was prepped with betadine and under sterile conditions the Euflexxa injection was performed with a 22 gauge needle. The needle was introduced into the joint, aspiration was performed to ensure intra-articular placement and the medication was injected. Upon withdrawal of the needle the site was cleaned with alcohol and a bandaid applied. The patient tolerated the injection well and there were no adverse effects. Post injection instructions included no strenuous activity for 24 hours, cryotherapy and if there are any adverse effects to contact the office.

## 2021-11-17 NOTE — HISTORY OF PRESENT ILLNESS
[de-identified] : 66 year old male presents to the office today for a Euflexxa injection into his arthritic left knee.

## 2021-11-22 ENCOUNTER — APPOINTMENT (OUTPATIENT)
Dept: ORTHOPEDIC SURGERY | Facility: CLINIC | Age: 66
End: 2021-11-22
Payer: COMMERCIAL

## 2021-11-22 PROCEDURE — 20610 DRAIN/INJ JOINT/BURSA W/O US: CPT | Mod: LT

## 2021-11-22 RX ORDER — HYALURONATE SODIUM 20 MG/2 ML
20 SYRINGE (ML) INTRAARTICULAR
Refills: 0 | Status: COMPLETED | OUTPATIENT
Start: 2021-11-22

## 2021-11-22 RX ADMIN — Medication 2 MG/2ML: at 00:00

## 2021-11-22 NOTE — HISTORY OF PRESENT ILLNESS
[de-identified] : 66 year old male presents to the office today for a Euflexxa injection into his arthritic left knee.

## 2022-02-02 NOTE — ASU PREOP CHECKLIST - BP NONINVASIVE DIASTOLIC (MM HG)
Plan:   1 - continue with oxcarbazepine 600mg tab take one tab twice a day  2 - continue with Lacosamide (Vimpat) 100mg twice a day   3 - continue with Venlafaxine ER 75mg and 37 5mg one tab each daily  4 - continue magnesium supplement and hydration for eye twitching  5 - follow up in 6 months 85

## 2022-03-02 ENCOUNTER — APPOINTMENT (OUTPATIENT)
Dept: CARDIOLOGY | Facility: CLINIC | Age: 67
End: 2022-03-02
Payer: COMMERCIAL

## 2022-03-02 VITALS
DIASTOLIC BLOOD PRESSURE: 100 MMHG | BODY MASS INDEX: 41.75 KG/M2 | HEIGHT: 73 IN | HEART RATE: 90 BPM | WEIGHT: 315 LBS | SYSTOLIC BLOOD PRESSURE: 160 MMHG

## 2022-03-02 LAB
ALBUMIN SERPL ELPH-MCNC: 4.6 G/DL
ALP BLD-CCNC: 78 U/L
ALT SERPL-CCNC: 44 U/L
ANION GAP SERPL CALC-SCNC: 15 MMOL/L
AST SERPL-CCNC: 21 U/L
BASOPHILS # BLD AUTO: 0.11 K/UL
BASOPHILS NFR BLD AUTO: 1.4 %
BILIRUB SERPL-MCNC: 0.2 MG/DL
BUN SERPL-MCNC: 21 MG/DL
CALCIUM SERPL-MCNC: 10.2 MG/DL
CHLORIDE SERPL-SCNC: 98 MMOL/L
CO2 SERPL-SCNC: 27 MMOL/L
CREAT SERPL-MCNC: 1.2 MG/DL
EGFR: 67 ML/MIN/1.73M2
EOSINOPHIL # BLD AUTO: 0.31 K/UL
EOSINOPHIL NFR BLD AUTO: 3.9 %
GLUCOSE SERPL-MCNC: 229 MG/DL
HCT VFR BLD CALC: 52.1 %
HGB BLD-MCNC: 16.3 G/DL
IMM GRANULOCYTES NFR BLD AUTO: 0.6 %
INR PPP: 0.9 RATIO
LYMPHOCYTES # BLD AUTO: 1.83 K/UL
LYMPHOCYTES NFR BLD AUTO: 23 %
MAN DIFF?: NORMAL
MCHC RBC-ENTMCNC: 24.7 PG
MCHC RBC-ENTMCNC: 31.3 G/DL
MCV RBC AUTO: 78.9 FL
MONOCYTES # BLD AUTO: 0.72 K/UL
MONOCYTES NFR BLD AUTO: 9.1 %
NEUTROPHILS # BLD AUTO: 4.92 K/UL
NEUTROPHILS NFR BLD AUTO: 62 %
PLATELET # BLD AUTO: 273 K/UL
POTASSIUM SERPL-SCNC: 4.1 MMOL/L
PROT SERPL-MCNC: 7.3 G/DL
PT BLD: 10.4 SEC
RBC # BLD: 6.6 M/UL
RBC # FLD: 17.7 %
SODIUM SERPL-SCNC: 140 MMOL/L
WBC # FLD AUTO: 7.94 K/UL

## 2022-03-02 PROCEDURE — 99204 OFFICE O/P NEW MOD 45 MIN: CPT

## 2022-03-02 PROCEDURE — 93000 ELECTROCARDIOGRAM COMPLETE: CPT

## 2022-03-02 PROCEDURE — 93306 TTE W/DOPPLER COMPLETE: CPT

## 2022-03-02 RX ORDER — LOSARTAN POTASSIUM 100 MG/1
100 TABLET, FILM COATED ORAL DAILY
Qty: 3 | Refills: 2 | Status: ACTIVE | COMMUNITY
Start: 2019-02-25

## 2022-03-02 RX ORDER — CHLORTHALIDONE 25 MG/1
25 TABLET ORAL DAILY
Refills: 0 | Status: ACTIVE | COMMUNITY

## 2022-03-02 NOTE — ASSESSMENT
[FreeTextEntry1] : cad\par anginal equivalent\par new onset exertional sob\par asmi by ekg with global ischemia\par systolic heart murmur\par htn\par diabetes

## 2022-03-02 NOTE — PHYSICAL EXAM

## 2022-03-02 NOTE — HISTORY OF PRESENT ILLNESS
[FreeTextEntry1] : 65 y/o white male with a negative cv history in the past.\par states that about 3 weeks ago developed new onset sob\par states that this sob is getting worse over the past 3 weeks and advised cardiac evaluatio\par patient has hypertension, diabetes, obesity and hyperlipidemia as his risk factors for cad\par ekg in office reveals nsr, anteroseptal q waves and global ischemia\par \par patient denies chf, arrythmias, syncope or heart murmurs\par no tia, cva or pvd\par \par will need cardiac cath and echo\par

## 2022-03-05 ENCOUNTER — LABORATORY RESULT (OUTPATIENT)
Age: 67
End: 2022-03-05

## 2022-03-08 ENCOUNTER — INPATIENT (INPATIENT)
Facility: HOSPITAL | Age: 67
LOS: 0 days | Discharge: HOME | End: 2022-03-09
Attending: STUDENT IN AN ORGANIZED HEALTH CARE EDUCATION/TRAINING PROGRAM | Admitting: STUDENT IN AN ORGANIZED HEALTH CARE EDUCATION/TRAINING PROGRAM
Payer: COMMERCIAL

## 2022-03-08 VITALS
SYSTOLIC BLOOD PRESSURE: 170 MMHG | HEART RATE: 86 BPM | RESPIRATION RATE: 18 BRPM | WEIGHT: 315 LBS | OXYGEN SATURATION: 95 % | HEIGHT: 73 IN | DIASTOLIC BLOOD PRESSURE: 74 MMHG

## 2022-03-08 DIAGNOSIS — I47.2 VENTRICULAR TACHYCARDIA: ICD-10-CM

## 2022-03-08 DIAGNOSIS — Z98.890 OTHER SPECIFIED POSTPROCEDURAL STATES: Chronic | ICD-10-CM

## 2022-03-08 LAB
ANION GAP SERPL CALC-SCNC: 12 MMOL/L — SIGNIFICANT CHANGE UP (ref 7–14)
BUN SERPL-MCNC: 17 MG/DL — SIGNIFICANT CHANGE UP (ref 10–20)
CALCIUM SERPL-MCNC: 8.9 MG/DL — SIGNIFICANT CHANGE UP (ref 8.5–10.1)
CHLORIDE SERPL-SCNC: 100 MMOL/L — SIGNIFICANT CHANGE UP (ref 98–110)
CO2 SERPL-SCNC: 28 MMOL/L — SIGNIFICANT CHANGE UP (ref 17–32)
CREAT SERPL-MCNC: 1.1 MG/DL — SIGNIFICANT CHANGE UP (ref 0.7–1.5)
EGFR: 74 ML/MIN/1.73M2 — SIGNIFICANT CHANGE UP
GLUCOSE SERPL-MCNC: 120 MG/DL — HIGH (ref 70–99)
HCT VFR BLD CALC: 52.3 % — HIGH (ref 42–52)
HGB BLD-MCNC: 16.2 G/DL — SIGNIFICANT CHANGE UP (ref 14–18)
MCHC RBC-ENTMCNC: 24.4 PG — LOW (ref 27–31)
MCHC RBC-ENTMCNC: 31 G/DL — LOW (ref 32–37)
MCV RBC AUTO: 78.9 FL — LOW (ref 80–94)
NRBC # BLD: 0 /100 WBCS — SIGNIFICANT CHANGE UP (ref 0–0)
PLATELET # BLD AUTO: 259 K/UL — SIGNIFICANT CHANGE UP (ref 130–400)
POTASSIUM SERPL-MCNC: 4.2 MMOL/L — SIGNIFICANT CHANGE UP (ref 3.5–5)
POTASSIUM SERPL-SCNC: 4.2 MMOL/L — SIGNIFICANT CHANGE UP (ref 3.5–5)
RBC # BLD: 6.63 M/UL — HIGH (ref 4.7–6.1)
RBC # FLD: 17.9 % — HIGH (ref 11.5–14.5)
SODIUM SERPL-SCNC: 140 MMOL/L — SIGNIFICANT CHANGE UP (ref 135–146)
WBC # BLD: 8.69 K/UL — SIGNIFICANT CHANGE UP (ref 4.8–10.8)
WBC # FLD AUTO: 8.69 K/UL — SIGNIFICANT CHANGE UP (ref 4.8–10.8)

## 2022-03-08 PROCEDURE — 93458 L HRT ARTERY/VENTRICLE ANGIO: CPT | Mod: 26,XU

## 2022-03-08 PROCEDURE — 92978 ENDOLUMINL IVUS OCT C 1ST: CPT | Mod: 26,LD

## 2022-03-08 PROCEDURE — 92928 PRQ TCAT PLMT NTRAC ST 1 LES: CPT | Mod: LD

## 2022-03-08 PROCEDURE — 93571 IV DOP VEL&/PRESS C FLO 1ST: CPT | Mod: 26,LD

## 2022-03-08 RX ORDER — ASPIRIN/CALCIUM CARB/MAGNESIUM 324 MG
81 TABLET ORAL DAILY
Refills: 0 | Status: DISCONTINUED | OUTPATIENT
Start: 2022-03-08 | End: 2022-03-09

## 2022-03-08 RX ORDER — TICAGRELOR 90 MG/1
90 TABLET ORAL EVERY 12 HOURS
Refills: 0 | Status: DISCONTINUED | OUTPATIENT
Start: 2022-03-08 | End: 2022-03-08

## 2022-03-08 RX ORDER — ATORVASTATIN CALCIUM 80 MG/1
20 TABLET, FILM COATED ORAL AT BEDTIME
Refills: 0 | Status: DISCONTINUED | OUTPATIENT
Start: 2022-03-08 | End: 2022-03-09

## 2022-03-08 RX ORDER — SODIUM CHLORIDE 9 MG/ML
1000 INJECTION INTRAMUSCULAR; INTRAVENOUS; SUBCUTANEOUS
Refills: 0 | Status: DISCONTINUED | OUTPATIENT
Start: 2022-03-08 | End: 2022-03-09

## 2022-03-08 RX ORDER — TICAGRELOR 90 MG/1
1 TABLET ORAL
Qty: 60 | Refills: 2
Start: 2022-03-08 | End: 2022-06-05

## 2022-03-08 RX ORDER — CHLORTHALIDONE 50 MG
1 TABLET ORAL
Qty: 0 | Refills: 0 | DISCHARGE

## 2022-03-08 RX ORDER — SODIUM CHLORIDE 9 MG/ML
3 INJECTION INTRAMUSCULAR; INTRAVENOUS; SUBCUTANEOUS EVERY 8 HOURS
Refills: 0 | Status: DISCONTINUED | OUTPATIENT
Start: 2022-03-08 | End: 2022-03-09

## 2022-03-08 RX ORDER — HYDROCHLOROTHIAZIDE 25 MG
25 TABLET ORAL DAILY
Refills: 0 | Status: DISCONTINUED | OUTPATIENT
Start: 2022-03-08 | End: 2022-03-09

## 2022-03-08 RX ORDER — LOSARTAN POTASSIUM 100 MG/1
100 TABLET, FILM COATED ORAL DAILY
Refills: 0 | Status: DISCONTINUED | OUTPATIENT
Start: 2022-03-08 | End: 2022-03-09

## 2022-03-08 RX ORDER — LABETALOL HCL 100 MG
10 TABLET ORAL ONCE
Refills: 0 | Status: COMPLETED | OUTPATIENT
Start: 2022-03-08 | End: 2022-03-08

## 2022-03-08 RX ORDER — PRASUGREL 5 MG/1
10 TABLET, FILM COATED ORAL DAILY
Refills: 0 | Status: DISCONTINUED | OUTPATIENT
Start: 2022-03-09 | End: 2022-03-09

## 2022-03-08 RX ORDER — AMLODIPINE BESYLATE 2.5 MG/1
1 TABLET ORAL
Qty: 0 | Refills: 0 | DISCHARGE

## 2022-03-08 RX ADMIN — SODIUM CHLORIDE 3 MILLILITER(S): 9 INJECTION INTRAMUSCULAR; INTRAVENOUS; SUBCUTANEOUS at 21:54

## 2022-03-08 RX ADMIN — Medication 10 MILLIGRAM(S): at 21:15

## 2022-03-08 RX ADMIN — ATORVASTATIN CALCIUM 20 MILLIGRAM(S): 80 TABLET, FILM COATED ORAL at 21:53

## 2022-03-08 NOTE — PATIENT PROFILE ADULT - FALL HARM RISK - HARM RISK INTERVENTIONS

## 2022-03-08 NOTE — PATIENT PROFILE ADULT - FUNCTIONAL ASSESSMENT - DAILY ACTIVITY ASSESSMENT TYPE
Referred by: Andrade Horne MD; Medical Diagnosis (from order):    Diagnosis Information      Diagnosis    831.01 (ICD-9-CM) - S43.015A (ICD-10-CM) - Anterior dislocation of left shoulder, initial encounter                Physical Therapy -  Daily Treatment Note    Visit:  5     SUBJECTIVE                                                                                                             Reports that he has been doing better. Notes that pain level is relatively low. Getting out of bed is still bad.   Functional Change: Overall improving.     Pain / Symptoms:  Pain rating (out of 10): Current: 1     OBJECTIVE                                                                                                                        TREATMENT                                                                                                                  Therapeutic Exercise:  UBE x 5 min  Active posterior glide with shoulder ER 15x5 sec holds   Sleeper stretch 2x30 secs left in sidelying  Sidelying shoulder ER x10 left; x10 performed with 1 lb DB.   Shoulder ER 2x10 with red resistance band   Horizontal abduction 10 with thumb down, x10 with thumb up and y on swiss ball 10x  in each  Scapular wall slides 10x with focus on low trap activaiton        Manual Therapy:  Supine posterior left GH mobs grade III   Supine inferior left GH mobs grade III      Skilled input: verbal instruction/cues    Writer verbally educated and received verbal consent for hand placement, positioning of patient, and techniques to be performed today from patient for therapist position for techniques as described above and how they are pertinent to the patient's plan of care.    Home Exercise Program: scapular retractions 3x15, prone Y's 3x10, resisted isometric ER walk-outs 3x10  Access Code: [email or web address]      Access Code: [email or web address]   URL: [email or web address]   Date: 12/09/2020   Prepared by: Bety Groves      Exercises Kneeling Push  Up- 15 reps- 1 sets- 1x daily- 7x weekly   Inchworm Walkout- 5 reps- 1 sets- 1x daily- 7x weekly   Kneeling Push Up- 15 reps- 1 sets- 1x daily- 7x weekly  Thoracic steppage x10 bilateral        ASSESSMENT                                                                                                             Patient had difficulty performing active posterior glides of the GH joint this session but this did improve as the session progressed. He has tightness of his posterior capsule limiting internal rotation ROM and causing poor GH joint mechanics. Overall he tolerated exercises well with no increase in pain. Reported relief following today's session.   Pain/symptoms after session: 0    Patient Education:   Results of above outlined education: Verbalizes understanding, Demonstrates understanding and Needs reinforcement      PLAN                                                                                                                           Suggestions for next session as indicated: Progress per plan of care, wall walks with yellow resistance band, continue lower trap strengthening and scapular retraining, posterior capsule joint mobilizations.           Procedures and total treatment time documented Time Entry flowsheet.   Admission

## 2022-03-08 NOTE — ASU PATIENT PROFILE, ADULT - NSICDXPASTMEDICALHX_GEN_ALL_CORE_FT
PAST MEDICAL HISTORY:  HTN (hypertension)     OA (osteoarthritis)     Obesity (BMI 35.0-39.9 without comorbidity)     [place] on CPAP

## 2022-03-08 NOTE — H&P CARDIOLOGY - HISTORY OF PRESENT ILLNESS
65 y/o male presents here today for [facility] d/t new onset dyspnea.          PMHx: HTN, DM, HLD, [place] on CPAP, obesity, + murmur        PSHx: hernia surgery, right hip surgery, right TKR    Pre cath note:    indication:  [ ] STEMI                [ ] NSTEMI                 [ ] Acute coronary syndrome                     [ ]Unstable Angina   [ ] high risk  [ ] intermediate risk  [ ] low risk                     [ ] Stable Angina     non-invasive testing:                          Date:                     result: [ ] high risk  [ ] intermediate risk  [ ] low risk    Anti- Anginal medications:                    [x ] not used                       [ ] used                   [ ] not used but strong indication not to use    Ejection Fraction                   [ ] <29            [ ] 30-39%   [ ] 40-49%     [x ]>50%    CHF                   [ ] active (within last 14 days on meds   [ ] Chronic (on meds but no exacerbation)    COPD                   [ ] mild (on chronic bronchodilators)  [ ] moderate (on chronic steroid therapy)      [ ] severe (indication for home O2 or PACO2 >50)    Other risk factors:                       [ ] Previous MI                     [ ] CVA/ stroke                    [ ] carotid stent/ CEA                    [ ] PVD/PAD- (arterial aneurysm, non-palpable pulses, tortuous vessel with inability to insert catheter, infra-renal dissection, renal or subclavian artery stenosis)                    [x ] diabetic                    [ ] previous CABG                    [ ] Renal Failure         RIGHT RADIAL ARTERY EVALUATION:  CHOCO TEST:  WNL    Cath Bleeding Risk: 1.4%

## 2022-03-08 NOTE — H&P CARDIOLOGY - NSICDXFAMILYHX_GEN_ALL_CORE_FT
FAMILY HISTORY:  Father  Still living? Unknown  HTN (hypertension), Age at diagnosis: Age Unknown    Mother  Still living? Unknown  DM (diabetes mellitus), Age at diagnosis: Age Unknown  Family history of lymphoma, Age at diagnosis: Age Unknown  HTN (hypertension), Age at diagnosis: Age Unknown

## 2022-03-08 NOTE — CHART NOTE - NSCHARTNOTEFT_GEN_A_CORE
PRE-OP DIAGNOSIS:  chest pain    PROCEDURE:   [] Coronary Angiogram   [x] LHC   [] LVG   [] RHC   [] Intervention (see below)       PHYSICIAN:  Dr. Flanagan  Fellow: Delores Chen    PROCEDURE DESCRIPTION:     Consent:    [x] Patient   [] Family Member   []  Used      Anesthesia:   [] General   [X] Sedation   [X] Local       Access & Closure:   [x] 6 Fr Radial Artery  -> D-stat     [] Fr Femoral Artery ->   [] Fr Femoral Vein ->   [] Fr Brachial Vein ->     IV Contrast: 180 mL      Intervention: iFR, IVUS     Implants:  JUSTYN x1 mLAD    AUC: 7     FINDINGS:   The coronary circulation is right dominant    There was significant 1-vessel coronary artery disease (LAD).     Ostial LAD: Angiography showed mild atherosclerosis with no flow limiting lesions.     Proximal LAD: Angiography showed mild atherosclerosis with no flow limiting lesions.   Mid LAD: There was a discrete 70 % stenosis. An intervention was performed. AUC 7, iFR 0.87   Distal LAD: Angiography showed mild atherosclerosis with no flow limiting lesions.   1st diagonal: Angiography showed mild atherosclerosis with no flow limiting lesions.   Circumflex: Angiography showed mild atherosclerosis with no flow limiting lesions. R  CA: Angiography showed mild atherosclerosis with no flow limiting lesions.     LVEDP: 18 mmHg     EF:  65 %      ESTIMATED BLOOD LOSS: < 10 mL      CONDITION:   [x] Good   [] Fair   [] Critical     SPECIMEN REMOVED: N/A     POST-OP DIAGNOSIS:    [] Normal Coronary Angiogram   [] Mild Coronary Artery Disease (< 50% stenosis)   [x] 1-Vessel Coronary Artery Disease      PLAN OF CARE:   [] D/C Home Today   [] Return to In-patient bed   [x] Admit for observation   [] Return for Staged Procedure   [] CT Surgery Consult   [X] Medications: ASA, brilinta x1 @ 23:30pm tonight then @ 10AM BID starting tomorrow, statin  [X] IV Fluids: NS @ 125cc/h x 10 hours  [] GERALD singletary PRE-OP DIAGNOSIS:  chest pain    PROCEDURE:   [] Coronary Angiogram   [x] LHC   [] LVG   [] RHC   [] Intervention (see below)       PHYSICIAN:  Dr. Flanagan  Fellow: Delores Chen    PROCEDURE DESCRIPTION:     Consent:    [x] Patient   [] Family Member   []  Used      Anesthesia:   [] General   [X] Sedation   [X] Local       Access & Closure:   [x] 6 Fr Radial Artery  -> D-stat     [] Fr Femoral Artery ->   [] Fr Femoral Vein ->   [] Fr Brachial Vein ->     IV Contrast: 180 mL      Intervention: iFR, IVUS     Implants:  JUSTYN x1 mLAD    AUC: 7     FINDINGS:   The coronary circulation is right dominant    There was significant 1-vessel coronary artery disease (LAD).     Ostial LAD: Angiography showed mild atherosclerosis with no flow limiting lesions.     Proximal LAD: Angiography showed mild atherosclerosis with no flow limiting lesions.   Mid LAD: There was a discrete 70 % stenosis. An intervention was performed. AUC 7, iFR 0.87   Distal LAD: Angiography showed mild atherosclerosis with no flow limiting lesions.   1st diagonal: Angiography showed mild atherosclerosis with no flow limiting lesions.   Circumflex: Angiography showed mild atherosclerosis with no flow limiting lesions.   RCA: Angiography showed mild atherosclerosis with no flow limiting lesions.     LVEDP: 18 mmHg     EF:  65 %      ESTIMATED BLOOD LOSS: < 10 mL      CONDITION:   [x] Good   [] Fair   [] Critical     SPECIMEN REMOVED: N/A     POST-OP DIAGNOSIS:    [] Normal Coronary Angiogram   [] Mild Coronary Artery Disease (< 50% stenosis)   [x] 1-Vessel Coronary Artery Disease      PLAN OF CARE:   [] D/C Home Today   [] Return to In-patient bed   [x] Admit for observation   [] Return for Staged Procedure   [] CT Surgery Consult   [X] Medications: ASA, brilinta x1 @ 23:30pm tonight then @ 10AM BID starting tomorrow, statin  [X] IV Fluids: NS @ 125cc/h x 10 hours  [] GERALD singletary PRE-OP DIAGNOSIS:  chest pain    PROCEDURE:   [] Coronary Angiogram   [x] LHC   [] LVG   [] RHC   [] Intervention (see below)       PHYSICIAN:  Dr. Flanagan  Fellow: Delores Chen    PROCEDURE DESCRIPTION:     Consent:    [x] Patient   [] Family Member   []  Used      Anesthesia:   [] General   [X] Sedation   [X] Local       Access & Closure:   [x] 6 Fr Radial Artery  -> D-stat     [] Fr Femoral Artery ->   [] Fr Femoral Vein ->   [] Fr Brachial Vein ->     IV Contrast: 180 mL      Intervention: iFR, IVUS     Implants:  JUSTYN x1 mLAD    AUC: 7     FINDINGS:   The coronary circulation is right dominant    There was significant 1-vessel coronary artery disease (LAD).     Ostial LAD: Angiography showed mild atherosclerosis with no flow limiting lesions.     Proximal LAD: Angiography showed mild atherosclerosis with no flow limiting lesions.   Mid LAD: There was a discrete 70 % stenosis. An intervention was performed. AUC 7, iFR 0.87   Distal LAD: Angiography showed mild atherosclerosis with no flow limiting lesions.   1st diagonal: Angiography showed mild atherosclerosis with no flow limiting lesions.   Circumflex: Angiography showed mild atherosclerosis with no flow limiting lesions.   RCA: Angiography showed mild atherosclerosis with no flow limiting lesions.     LVEDP: 35 mmHg     EF:  65 %      ESTIMATED BLOOD LOSS: < 10 mL      CONDITION:   [x] Good   [] Fair   [] Critical     SPECIMEN REMOVED: N/A     POST-OP DIAGNOSIS:    [] Normal Coronary Angiogram   [] Mild Coronary Artery Disease (< 50% stenosis)   [x] 1-Vessel Coronary Artery Disease      PLAN OF CARE:   [] D/C Home Today   [] Return to In-patient bed   [x] Admit for observation   [] Return for Staged Procedure   [] CT Surgery Consult   [X] Medications: ASA, brilinta x1 @ 23:30pm tonight then @ 10AM BID starting tomorrow, statin  [X] IV Fluids: NS @ 125cc/h x 10 hours  [] GERALD singletary

## 2022-03-08 NOTE — CHART NOTE - NSCHARTNOTEFT_GEN_A_CORE
Patient's pharmacy was called, Brilinta not covered by insurance. D/C.   Medication was switch to Effient 10 mg daily po which cost $10 per month. Patient agreeing, medication ready for pickup.  Patient will be given loading dose of Effient 30 mg PO x1 now.

## 2022-03-09 ENCOUNTER — TRANSCRIPTION ENCOUNTER (OUTPATIENT)
Age: 67
End: 2022-03-09

## 2022-03-09 VITALS
OXYGEN SATURATION: 96 % | DIASTOLIC BLOOD PRESSURE: 65 MMHG | TEMPERATURE: 99 F | HEART RATE: 81 BPM | SYSTOLIC BLOOD PRESSURE: 142 MMHG | RESPIRATION RATE: 22 BRPM

## 2022-03-09 LAB
ALBUMIN SERPL ELPH-MCNC: 4.2 G/DL — SIGNIFICANT CHANGE UP (ref 3.5–5.2)
ALP SERPL-CCNC: 72 U/L — SIGNIFICANT CHANGE UP (ref 30–115)
ALT FLD-CCNC: 39 U/L — SIGNIFICANT CHANGE UP (ref 0–41)
ANION GAP SERPL CALC-SCNC: 12 MMOL/L — SIGNIFICANT CHANGE UP (ref 7–14)
AST SERPL-CCNC: 25 U/L — SIGNIFICANT CHANGE UP (ref 0–41)
BASOPHILS # BLD AUTO: 0.1 K/UL — SIGNIFICANT CHANGE UP (ref 0–0.2)
BASOPHILS NFR BLD AUTO: 1.3 % — HIGH (ref 0–1)
BILIRUB SERPL-MCNC: 0.5 MG/DL — SIGNIFICANT CHANGE UP (ref 0.2–1.2)
BUN SERPL-MCNC: 16 MG/DL — SIGNIFICANT CHANGE UP (ref 10–20)
CALCIUM SERPL-MCNC: 8.8 MG/DL — SIGNIFICANT CHANGE UP (ref 8.5–10.1)
CHLORIDE SERPL-SCNC: 98 MMOL/L — SIGNIFICANT CHANGE UP (ref 98–110)
CO2 SERPL-SCNC: 25 MMOL/L — SIGNIFICANT CHANGE UP (ref 17–32)
CREAT SERPL-MCNC: 1 MG/DL — SIGNIFICANT CHANGE UP (ref 0.7–1.5)
EGFR: 84 ML/MIN/1.73M2 — SIGNIFICANT CHANGE UP
EOSINOPHIL # BLD AUTO: 0.3 K/UL — SIGNIFICANT CHANGE UP (ref 0–0.7)
EOSINOPHIL NFR BLD AUTO: 3.8 % — SIGNIFICANT CHANGE UP (ref 0–8)
GLUCOSE BLDC GLUCOMTR-MCNC: 109 MG/DL — HIGH (ref 70–99)
GLUCOSE BLDC GLUCOMTR-MCNC: 124 MG/DL — HIGH (ref 70–99)
GLUCOSE SERPL-MCNC: 135 MG/DL — HIGH (ref 70–99)
HCT VFR BLD CALC: 48 % — SIGNIFICANT CHANGE UP (ref 42–52)
HGB BLD-MCNC: 15.4 G/DL — SIGNIFICANT CHANGE UP (ref 14–18)
IMM GRANULOCYTES NFR BLD AUTO: 0.4 % — HIGH (ref 0.1–0.3)
LDLC SERPL DIRECT ASSAY-MCNC: 115 MG/DL — SIGNIFICANT CHANGE UP
LYMPHOCYTES # BLD AUTO: 1.71 K/UL — SIGNIFICANT CHANGE UP (ref 1.2–3.4)
LYMPHOCYTES # BLD AUTO: 21.8 % — SIGNIFICANT CHANGE UP (ref 20.5–51.1)
MCHC RBC-ENTMCNC: 24.8 PG — LOW (ref 27–31)
MCHC RBC-ENTMCNC: 32.1 G/DL — SIGNIFICANT CHANGE UP (ref 32–37)
MCV RBC AUTO: 77.4 FL — LOW (ref 80–94)
MONOCYTES # BLD AUTO: 0.76 K/UL — HIGH (ref 0.1–0.6)
MONOCYTES NFR BLD AUTO: 9.7 % — HIGH (ref 1.7–9.3)
NEUTROPHILS # BLD AUTO: 4.93 K/UL — SIGNIFICANT CHANGE UP (ref 1.4–6.5)
NEUTROPHILS NFR BLD AUTO: 63 % — SIGNIFICANT CHANGE UP (ref 42.2–75.2)
NRBC # BLD: 0 /100 WBCS — SIGNIFICANT CHANGE UP (ref 0–0)
PLATELET # BLD AUTO: 233 K/UL — SIGNIFICANT CHANGE UP (ref 130–400)
POTASSIUM SERPL-MCNC: 3.9 MMOL/L — SIGNIFICANT CHANGE UP (ref 3.5–5)
POTASSIUM SERPL-SCNC: 3.9 MMOL/L — SIGNIFICANT CHANGE UP (ref 3.5–5)
PROT SERPL-MCNC: 6.6 G/DL — SIGNIFICANT CHANGE UP (ref 6–8)
RBC # BLD: 6.2 M/UL — HIGH (ref 4.7–6.1)
RBC # FLD: 17.6 % — HIGH (ref 11.5–14.5)
SODIUM SERPL-SCNC: 135 MMOL/L — SIGNIFICANT CHANGE UP (ref 135–146)
WBC # BLD: 7.83 K/UL — SIGNIFICANT CHANGE UP (ref 4.8–10.8)
WBC # FLD AUTO: 7.83 K/UL — SIGNIFICANT CHANGE UP (ref 4.8–10.8)

## 2022-03-09 PROCEDURE — 99232 SBSQ HOSP IP/OBS MODERATE 35: CPT

## 2022-03-09 PROCEDURE — 93010 ELECTROCARDIOGRAM REPORT: CPT

## 2022-03-09 RX ORDER — HYDROCHLOROTHIAZIDE 25 MG
25 TABLET ORAL DAILY
Refills: 0 | Status: DISCONTINUED | OUTPATIENT
Start: 2022-03-09 | End: 2022-03-09

## 2022-03-09 RX ORDER — INSULIN LISPRO 100/ML
VIAL (ML) SUBCUTANEOUS
Refills: 0 | Status: DISCONTINUED | OUTPATIENT
Start: 2022-03-09 | End: 2022-03-09

## 2022-03-09 RX ORDER — DEXTROSE 50 % IN WATER 50 %
12.5 SYRINGE (ML) INTRAVENOUS ONCE
Refills: 0 | Status: DISCONTINUED | OUTPATIENT
Start: 2022-03-09 | End: 2022-03-09

## 2022-03-09 RX ORDER — ASPIRIN/CALCIUM CARB/MAGNESIUM 324 MG
1 TABLET ORAL
Qty: 60 | Refills: 0
Start: 2022-03-09 | End: 2022-05-07

## 2022-03-09 RX ORDER — ATORVASTATIN CALCIUM 80 MG/1
1 TABLET, FILM COATED ORAL
Qty: 0 | Refills: 0 | DISCHARGE
Start: 2022-03-09

## 2022-03-09 RX ORDER — METOPROLOL TARTRATE 50 MG
25 TABLET ORAL DAILY
Refills: 0 | Status: DISCONTINUED | OUTPATIENT
Start: 2022-03-09 | End: 2022-03-09

## 2022-03-09 RX ORDER — PRASUGREL 5 MG/1
1 TABLET, FILM COATED ORAL
Qty: 60 | Refills: 0
Start: 2022-03-09 | End: 2022-05-07

## 2022-03-09 RX ORDER — DEXTROSE 50 % IN WATER 50 %
25 SYRINGE (ML) INTRAVENOUS ONCE
Refills: 0 | Status: DISCONTINUED | OUTPATIENT
Start: 2022-03-09 | End: 2022-03-09

## 2022-03-09 RX ORDER — LOSARTAN POTASSIUM 100 MG/1
1 TABLET, FILM COATED ORAL
Qty: 0 | Refills: 0 | DISCHARGE

## 2022-03-09 RX ORDER — DEXTROSE 50 % IN WATER 50 %
15 SYRINGE (ML) INTRAVENOUS ONCE
Refills: 0 | Status: DISCONTINUED | OUTPATIENT
Start: 2022-03-09 | End: 2022-03-09

## 2022-03-09 RX ORDER — SODIUM CHLORIDE 9 MG/ML
1000 INJECTION, SOLUTION INTRAVENOUS
Refills: 0 | Status: DISCONTINUED | OUTPATIENT
Start: 2022-03-09 | End: 2022-03-09

## 2022-03-09 RX ORDER — ATORVASTATIN CALCIUM 80 MG/1
1 TABLET, FILM COATED ORAL
Qty: 0 | Refills: 0 | DISCHARGE

## 2022-03-09 RX ORDER — AMLODIPINE BESYLATE 2.5 MG/1
10 TABLET ORAL DAILY
Refills: 0 | Status: DISCONTINUED | OUTPATIENT
Start: 2022-03-09 | End: 2022-03-09

## 2022-03-09 RX ORDER — GLUCAGON INJECTION, SOLUTION 0.5 MG/.1ML
1 INJECTION, SOLUTION SUBCUTANEOUS ONCE
Refills: 0 | Status: DISCONTINUED | OUTPATIENT
Start: 2022-03-09 | End: 2022-03-09

## 2022-03-09 RX ORDER — METOPROLOL TARTRATE 50 MG
1 TABLET ORAL
Qty: 60 | Refills: 0
Start: 2022-03-09 | End: 2022-05-07

## 2022-03-09 RX ORDER — LOSARTAN POTASSIUM 100 MG/1
1 TABLET, FILM COATED ORAL
Qty: 0 | Refills: 0 | DISCHARGE
Start: 2022-03-09

## 2022-03-09 RX ADMIN — SODIUM CHLORIDE 3 MILLILITER(S): 9 INJECTION INTRAMUSCULAR; INTRAVENOUS; SUBCUTANEOUS at 14:46

## 2022-03-09 RX ADMIN — SODIUM CHLORIDE 3 MILLILITER(S): 9 INJECTION INTRAMUSCULAR; INTRAVENOUS; SUBCUTANEOUS at 07:28

## 2022-03-09 RX ADMIN — Medication 81 MILLIGRAM(S): at 11:35

## 2022-03-09 RX ADMIN — PRASUGREL 10 MILLIGRAM(S): 5 TABLET, FILM COATED ORAL at 11:35

## 2022-03-09 RX ADMIN — Medication 25 MILLIGRAM(S): at 11:35

## 2022-03-09 RX ADMIN — LOSARTAN POTASSIUM 100 MILLIGRAM(S): 100 TABLET, FILM COATED ORAL at 05:04

## 2022-03-09 RX ADMIN — Medication 25 MILLIGRAM(S): at 05:04

## 2022-03-09 NOTE — PROGRESS NOTE ADULT - ATTENDING COMMENTS
Agree with above  s/p PCI to LAD, stable , asymptomatic, no major events, NSVT on tele monitor  plan to dc home on DAPT, statin BB and ARB

## 2022-03-09 NOTE — DISCHARGE NOTE PROVIDER - NSDCMRMEDTOKEN_GEN_ALL_CORE_FT
aspirin 81 mg oral tablet, chewable: 1 tab(s) orally once a day   atorvastatin 20 mg oral tablet: 1 tab(s) orally once a day (at bedtime)  chlorthalidone 25 mg oral tablet: 1 tab(s) orally once a day  losartan 100 mg oral tablet: 1 tab(s) orally once a day  metoprolol succinate 25 mg oral tablet, extended release: 1 tab(s) orally once a day  prasugrel 10 mg oral tablet: 1 tab(s) orally once a day

## 2022-03-09 NOTE — DISCHARGE NOTE PROVIDER - NSDCFUSCHEDAPPT_GEN_ALL_CORE_FT
DAMIEN DAS ; 03/14/2022 ; [address] ; 05/23/2022 ; [address]

## 2022-03-09 NOTE — DISCHARGE NOTE NURSING/CASE MANAGEMENT/SOCIAL WORK - NSDCPEFALRISK_GEN_ALL_CORE
For information on Fall & Injury Prevention, visit: [email or web address] OR  [email or web address] OR  https://www.cdc.gov/steadi/patient.html

## 2022-03-09 NOTE — PROGRESS NOTE ADULT - SUBJECTIVE AND OBJECTIVE BOX
DAMIEN DAS 66y Male  MRN#: [id number]   CODE STATUS:__Full______    Hospital Day: 1d    Pt is currently admitted for cardiac catherization with possible intervention.    SUBJECTIVE    67 yo male with a cc of SOB on exertion. Pt states that he had 3w of sob and it was exacerbated when he had to climb a lot of steps to the roof of a building while at work and he needed to sit down for it to go away. Pt states that it has been hard for him to walk even 1/2 a block due to the sob. During these episodes he starts to sweat. Pt denies chest pain, orthopnea or palpitations.    Hospital Course: Pt had LHC on 3/8. Showed Mid LAD with a discrete 70 % stenosis. Implanted JUSTYN x1 mLAD AUC 7, iFR 0.87; EF:  65 %. Pt was admitted for observation.    Overnight events: Pts bp went up to 222/88 and was given IV labetalol -> went down to 165/85. On telemetry there was NSVT and AIVR.    Subjective complaints: Pt states that he feels SOB while walking to the bathroom today and he needed to sit down for it to go away.                                               ----------------------------------------------------------  OBJECTIVE  PAST MEDICAL & SURGICAL HISTORY  HTN (hypertension)    Obesity (BMI 35.0-39.9 without comorbidity)    [place] on CPAP    OA (osteoarthritis)    History of surgery  RIGHT HIP SX  B/L KNEE ARTHROSCOPY  RIGHT TKR- 4/17                                              -----------------------------------------------------------  ALLERGIES:  No Known Allergies                                            ------------------------------------------------------------    HOME MEDICATIONS  Home Medications:  atorvastatin 20 mg oral tablet: 1 tab(s) orally once a day (08 Mar 2022 12:23)  chlorthalidone 25 mg oral tablet: 1 tab(s) orally once a day (08 Mar 2022 12:23)  losartan 100 mg oral tablet: 1 tab(s) orally once a day (08 Mar 2022 12:23)                           MEDICATIONS:  STANDING MEDICATIONS  aspirin  chewable 81 milliGRAM(s) Oral daily  atorvastatin 20 milliGRAM(s) Oral at bedtime  dextrose 40% Gel 15 Gram(s) Oral once  dextrose 5%. 1000 milliLiter(s) IV Continuous <Continuous>  dextrose 5%. 1000 milliLiter(s) IV Continuous <Continuous>  dextrose 50% Injectable 25 Gram(s) IV Push once  dextrose 50% Injectable 12.5 Gram(s) IV Push once  dextrose 50% Injectable 25 Gram(s) IV Push once  glucagon  Injectable 1 milliGRAM(s) IntraMuscular once  hydrochlorothiazide 25 milliGRAM(s) Oral daily  insulin lispro (ADMELOG) corrective regimen sliding scale   SubCutaneous three times a day before meals  losartan 100 milliGRAM(s) Oral daily  metoprolol succinate ER 25 milliGRAM(s) Oral daily  prasugrel 10 milliGRAM(s) Oral daily  sodium chloride 0.9% lock flush 3 milliLiter(s) IV Push every 8 hours  sodium chloride 0.9%. 1000 milliLiter(s) IV Continuous <Continuous>    PRN MEDICATIONS                                            ------------------------------------------------------------  VITAL SIGNS: Last 24 Hours  T(C): 36.6 (09 Mar 2022 10:46), Max: 36.7 (09 Mar 2022 04:30)  T(F): 97.9 (09 Mar 2022 10:46), Max: 98 (09 Mar 2022 04:30)  HR: 96 (09 Mar 2022 10:46) (80 - 96)  BP: 131/61 (09 Mar 2022 10:46) (131/61 - 222/88)  BP(mean): 106 (08 Mar 2022 12:31) (106 - 106)  RR: 18 (09 Mar 2022 10:46) (18 - 18)  SpO2: 95% (09 Mar 2022 10:46) (95% - 95%)      03-08-22 @ 07:01  -  03-09-22 @ 07:00  --------------------------------------------------------  IN: 0 mL / OUT: 875 mL / NET: -875 mL    03-09-22 @ 07:01  -  03-09-22 @ 10:59  --------------------------------------------------------  IN: 0 mL / OUT: 275 mL / NET: -275 mL                                             --------------------------------------------------------------  LABS:                        15.4   7.83  )-----------( 233      ( 09 Mar 2022 06:00 )             48.0     03-09    135  |  98  |  16  ----------------------------<  135<H>  3.9   |  25  |  1.0    Ca    8.8      09 Mar 2022 06:00    TPro  6.6  /  Alb  4.2  /  TBili  0.5  /  DBili  x   /  AST  25  /  ALT  39  /  AlkPhos  72  03-09                                          -------------------------------------------------------------  RADIOLOGY:  Cath 3/8/22  The coronary circulation is right dominant  There was significant 1-vessel coronary artery disease (LAD).   Ostial LAD: Angiography showed mild atherosclerosis with no flow limiting lesions.   Proximal LAD: Angiography showed mild atherosclerosis with no flow limiting lesions.   Mid LAD: There was a discrete 70 % stenosis. An intervention was performed. AUC 7, iFR 0.87   Distal LAD: Angiography showed mild atherosclerosis with no flow limiting lesions.   1st diagonal: Angiography showed mild atherosclerosis with no flow limiting lesions.   Circumflex: Angiography showed mild atherosclerosis with no flow limiting lesions.   RCA: Angiography showed mild atherosclerosis with no flow limiting lesions.   LVEDP: 35 mmHg   EF:  65 %                                             --------------------------------------------------------------    PHYSICAL EXAM:  GENERAL: Nondistressed, wellgroomed  HEART: normal S1 and S2; No rubs or gallops  LUNGS: Clear to auscultation bilaterally.  ABDOMEN: Soft, positive bowel sounds, nontender, no organomegaly.  SKIN: No rash, bilateral LE hyperpigmentation        
Cardiology Follow up    DAMIEN DAS   66y Male  PAST MEDICAL & SURGICAL HISTORY:  HTN (hypertension)    Obesity (BMI 35.0-39.9 without comorbidity)    [place] on CPAP    OA (osteoarthritis)    History of surgery  RIGHT HIP SX  B/L KNEE ARTHROSCOPY  RIGHT TKR- 4/17         HPI:  65 y/o male presents here today for [facility] d/t new onset dyspnea.          PMHx: HTN, DM, HLD, [place] on CPAP, obesity, + murmur        PSHx: hernia surgery, right hip surgery, right TKR    Pre cath note:    indication:  [ ] STEMI                [ ] NSTEMI                 [ ] Acute coronary syndrome                     [ ]Unstable Angina   [ ] high risk  [ ] intermediate risk  [ ] low risk                     [ ] Stable Angina     non-invasive testing:                          Date:                     result: [ ] high risk  [ ] intermediate risk  [ ] low risk    Anti- Anginal medications:                    [x ] not used                       [ ] used                   [ ] not used but strong indication not to use    Ejection Fraction                   [ ] <29            [ ] 30-39%   [ ] 40-49%     [x ]>50%    CHF                   [ ] active (within last 14 days on meds   [ ] Chronic (on meds but no exacerbation)    COPD                   [ ] mild (on chronic bronchodilators)  [ ] moderate (on chronic steroid therapy)      [ ] severe (indication for home O2 or PACO2 >50)    Other risk factors:                       [ ] Previous MI                     [ ] CVA/ stroke                    [ ] carotid stent/ CEA                    [ ] PVD/PAD- (arterial aneurysm, non-palpable pulses, tortuous vessel with inability to insert catheter, infra-renal dissection, renal or subclavian artery stenosis)                    [x ] diabetic                    [ ] previous CABG                    [ ] Renal Failure         RIGHT RADIAL ARTERY EVALUATION:  CHOCO TEST:  WNL    Cath Bleeding Risk: 1.4%                        (08 Mar 2022 12:31)    Allergies    No Known Allergies    Intolerances    Patient examined at bedside.   Patient with c/o mild dyspnea on exertion  Denies CP, palpitations, or dizziness  10beat NSVT overnight    Vital Signs Last 24 Hrs  T(C): 36.7 (09 Mar 2022 04:30), Max: 36.7 (09 Mar 2022 04:30)  T(F): 98 (09 Mar 2022 04:30), Max: 98 (09 Mar 2022 04:30)  HR: 80 (09 Mar 2022 04:30) (80 - 95)  BP: 181/89 (09 Mar 2022 04:30) (165/85 - 222/88)  BP(mean): 106 (08 Mar 2022 12:31) (106 - 106)  RR: 18 (09 Mar 2022 04:30) (18 - 18)  SpO2: 95% (08 Mar 2022 12:31) (95% - 95%)    MEDICATIONS  (STANDING):  aspirin  chewable 81 milliGRAM(s) Oral daily  atorvastatin 20 milliGRAM(s) Oral at bedtime  hydrochlorothiazide 25 milliGRAM(s) Oral daily  losartan 100 milliGRAM(s) Oral daily  prasugrel 10 milliGRAM(s) Oral daily  sodium chloride 0.9% lock flush 3 milliLiter(s) IV Push every 8 hours  sodium chloride 0.9%. 1000 milliLiter(s) (125 mL/Hr) IV Continuous <Continuous>    MEDICATIONS  (PRN):      REVIEW OF SYSTEMS:          All negative except as mentioned in HPI    PHYSICAL EXAM:           CONSTITUTIONAL: Well-developed; well-nourished; in no acute distress  	SKIN: warm, dry  	HEAD: Normocephalic; atraumatic  	EYES: PERRL.  	ENT: No nasal discharge, airway clear, mucous membranes moist  	NECK: Supple; non tender.  	CARD: +S1, +S2, + murmur, gallops, or rubs. Regular rate and rhythm    	RESP: No wheezes, rales or rhonchi. CTA B/L  	ABD: soft ntnd, + BS x 4 quadrants  	EXT: moves all extremities,  no clubbing, cyanosis or edema  	NEURO: Alert and oriented x3, no focal deficits          PSYCH: Cooperative, appropriate          VASCULAR:  + Rad / + PTs / + DPs          EXTREMITY:             Right Radial:  pressure dressing removed, access site soft, no hematoma, no pain, + pulses, no sign of infection, no numbness            ECG: 3/9/22  SR w/  PACs, T wave abnormality  78bpm  QT//449                                                                                                                LABS:                        15.4   7.83  )-----------( 233      ( 09 Mar 2022 06:00 )             48.0     03-09    135  |  98  |  16  ----------------------------<  135<H>  3.9   |  25  |  1.0    Ca    8.8      09 Mar 2022 06:00    TPro  6.6  /  Alb  4.2  /  TBili  0.5  /  DBili  x   /  AST  25  /  ALT  39  /  AlkPhos  72  03-09        LIVER FUNCTIONS - ( 09 Mar 2022 06:00 )  Alb: 4.2 g/dL / Pro: 6.6 g/dL / ALK PHOS: 72 U/L / ALT: 39 U/L / AST: 25 U/L / GGT: x             A/P:  I discussed the case with Cardiologist Dr. Flanagan  and recommend the following:    S/P PCI 3/8:    Intervention: iFR, IVUS  Implants:  JUTSYN x1 mLAD    POST-OP DIAGNOSIS:    [x] 1-Vessel Coronary Artery Disease         	     Continue DAPT ( Aspirin 81 mg PO Daily and Effient 10mg po daily ), Statin Therapy, Losartan                   Patient pharmacy called in for Effient prescription and it is $10 per month, patient aware and agreeable, Rx available for pickup                   START BB Metoprolol 25mg po                    OOB ambulate                   Monitor access site                   Patient agreeing to take DAPT for at least one year or as directed by cardiologist                    Pt given instructions on importance of taking antiplatelet medication or risk acute stent thrombosis/death                   Post cath instructions, access site care and activity restrictions reviewed with patient                     Discussed with patient to return to hospital if experience chest pain, shortness breath, dizziness and site bleeding                   Aggressive risk factor modification, diet counseling, smoking cessation discussed with patient                    Cardiac rehab info provided and communication to cardiac rehab provided                      Can discharge patient  from cardiac standpoint after ambulating without symptoms, access site wnl, labs and ECG reviewed                    Follow up with Cardiology Dr. Flanagan in two weeks.  Instructed to call and make an appointment

## 2022-03-09 NOTE — DISCHARGE NOTE PROVIDER - CARE PROVIDER_API CALL
Sav Moody)  Internal Medicine  [address] [place] [address]  Phone: [phone]  Fax: [phone]  Follow Up Time: 2 weeks    Calos Flanagan)  Cardiovascular Disease; Internal Medicine; Interventional Cardiology  [address] [place] [address]  Phone: [phone]  Fax: [phone]  Follow Up Time: 1 week

## 2022-03-09 NOTE — DISCHARGE NOTE PROVIDER - NSDCCPCAREPLAN_GEN_ALL_CORE_FT
PRINCIPAL DISCHARGE DIAGNOSIS  Diagnosis: CAD (coronary artery disease)  Assessment and Plan of Treatment: Coronary artery disease (CAD) is narrowing of the arteries to your heart caused by a buildup of plaque. Plaque is made up of cholesterol and other substances. The narrowing in your arteries decreases the amount of blood that can flow to your heart. This causes your heart to get less oxygen.   Contact your healthcare provider if:  - You have any of the following signs of a heart attack:   Squeezing, pressure, or pain in your chest   and any of the following:   Discomfort or pain in your back, neck, jaw, stomach, or arm  - Shortness of breath  - Nausea or vomiting  -   Lightheadedness or a sudden cold sweat  - You have chest pain that is more frequent, or you have chest pain at rest  - You have questions or concerns about your condition or care.  Cholesterol medicines help lower blood cholesterol levels.   Nitrates, such as nitroglycerin, relax the arteries of your heart so it gets more oxygen. They help to relieve your chest pain.   Antiplatelets, such as aspirin, and Plavix (clopidogrel) help prevent blood clots. Take your antiplatelet medicine exactly as directed. These medications make it more likely for you to bleed or bruise. If you are told to take aspirin, do not take acetaminophen or ibuprofen instead.   Blood thinners help prevent blood clots. Examples of blood thinners include heparin and warfarin. Clots can cause strokes, heart attacks, and death. The following are general safety guidelines to follow while you are taking a blood thinner:   Watch for bleeding and bruising while you take blood thinners. Watch for bleeding from your gums or nose. Watch for blood in your urine and bowel movements. This can keep your skin and gums from  If you shave, use an electric shaver. Do not play contact sports.   Do not start or stop any medicines unless your healthcare provider tells you to.   Do not smoke. Nicotine and other chemicals in cigarettes and cigars can cause heart and lung damage.

## 2022-03-09 NOTE — PROGRESS NOTE ADULT - ASSESSMENT
67 yo male with a cc of SOB on exertion for 3w of sob and it was exacerbated when he had to climb a lot of steps to the roof of a building while at work and he needed to sit down for it to go away; hard for him to walk even 1/2 a block due to the sob and pt becomes diaphoretic.    #CAD; SOB  -LHC on 3/8. Showed Mid LAD with a discrete 70 % stenosis.   -Implanted JUSTYN x1 mLAD AUC 7, iFR 0.87; EF: 65 %.  -Pt bp last night 222/88 and was given IV labetalol -> went down to 165/85  -overnight telemetry showed NSVT and AIVR  -Effient 10mg PO daily; Pt was given loading dose 30mg yesterday 3/8/22.  -metoprolol 25mg PO qd, atorvastatin 20mg PO at bedtime, ASA 81mg PO qd  -Can give pt amlodipine 5mg if still need to control bp.  -If pt does not have any SOB or chest pain today then possible discharge.                                                                                     ----------------------------------------------------  # DVT prophylaxis: Effient 10mg daily PO    # GI prophylaxis: n/a    # Diet: Dash    # Activity Score (AM-PAC): AAT    # Code status: Full   67 yo male with a cc of SOB on exertion for 3w of sob and it was exacerbated when he had to climb a lot of steps to the roof of a building while at work and he needed to sit down for it to go away; hard for him to walk even 1/2 a block due to the sob and pt becomes diaphoretic.    #CAD; SOB  -LHC on 3/8. Showed Mid LAD with a discrete 70 % stenosis.   -Implanted JUSTYN x1 mLAD AUC 7, iFR 0.87; EF: 65 %.  -Pt bp last night 222/88 and was given IV labetalol -> went down to 165/85  -overnight telemetry showed NSVT and AIVR  -Effient 10mg PO daily; Pt was given loading dose 30mg yesterday 3/8/22.  -metoprolol 25mg PO qd, atorvastatin 20mg PO at bedtime, ASA 81mg PO qd (loading dose yesterday)   -HCTZ/losartan as home meds  -If pt does not have any SOB or chest pain today then possible discharge.     Patient is requesting o stay for one more day for evaluation of his medical condition     #HTN  #DLD  #[place]  - continue home meds  - f/u Lipid profile   - control HTN                                                                                 ----------------------------------------------------  # DVT prophylaxis: Effient 10mg daily PO    # GI prophylaxis: n/a    # Diet: Dash    # Activity Score (AM-PAC): AAT    # Code status: Full

## 2022-03-09 NOTE — DISCHARGE NOTE PROVIDER - CARE PROVIDERS DIRECT ADDRESSES
,DirectAddress_Unknown,[email or web address]

## 2022-03-09 NOTE — DISCHARGE NOTE NURSING/CASE MANAGEMENT/SOCIAL WORK - PATIENT PORTAL LINK FT
You can access the FollowMyHealth Patient Portal offered by [facility] by registering at the following website: [email or web address]. By joining [facility]s FollowMyHealth portal, you will also be able to view your health information using other applications (apps) compatible with our system.

## 2022-03-09 NOTE — DISCHARGE NOTE PROVIDER - PROVIDER TOKENS
PROVIDER:[TOKEN:[[id number]],FOLLOWUP:[2 weeks]],PROVIDER:[TOKEN:[[id number]],FOLLOWUP:[1 week]]

## 2022-03-09 NOTE — DISCHARGE NOTE PROVIDER - HOSPITAL COURSE
This is a case of a 66 year old gentleman KTH DM, DL, CAD, [place] on home CPAP presented to the ED with acute onset dyspnea. Dyspnea worsens on excersion associated with diaphoresis.  No Orthopnea, PND, or LE edema.  He was admitted for LHC which showed a mLAD stenosis s/p JUSTYN.  LVEF 65%.  After cath he was hypertensive and opted to stay inpatient for one more day.  Patient is medically stable and ready for discharge.

## 2022-03-14 ENCOUNTER — APPOINTMENT (OUTPATIENT)
Dept: ORTHOPEDIC SURGERY | Facility: CLINIC | Age: 67
End: 2022-03-14
Payer: COMMERCIAL

## 2022-03-14 DIAGNOSIS — M17.12 UNILATERAL PRIMARY OSTEOARTHRITIS, LEFT KNEE: ICD-10-CM

## 2022-03-14 DIAGNOSIS — Z96.651 PRESENCE OF RIGHT ARTIFICIAL KNEE JOINT: ICD-10-CM

## 2022-03-14 PROCEDURE — 73562 X-RAY EXAM OF KNEE 3: CPT | Mod: RT

## 2022-03-14 PROCEDURE — 99213 OFFICE O/P EST LOW 20 MIN: CPT

## 2022-03-14 NOTE — HISTORY OF PRESENT ILLNESS
[de-identified] : 4/7/2021 - 65 year old male s/p revision right total knee replacement presents to the office today for a follow up. Patient continues complaining of pain in the knee. He states that he feels the knee andrew a few times a month. Patient states he can only ambulate about one block and then needs rest due to the pain. He is currently taking Aleve for pain occasionally with only some relief. There is pain in the knee at night which he reports wakes him up. When we last saw him we obtained an ESR/CRP which came back within normal limits. Patient is here today to discuss his next options for pain relief.\par \par 7/21/2021 - 65 year old male s/p revision right total knee replacement presents to the office today for a follow up. He reports doing PT since we last saw him but states he did it for 12 weeks without relief. Pt states in the last week his knee has buckled twice. While he reports having pain, he denies taking anything for it. \par \par 10/20/2021 - 65 year old male s/p revision right total knee replacement presents to the office today for a follow up. \par \par 11/8/2021 - Euflexxa injection\par \par 11/17/2021 -  Euflexxa injection\par \par 11/22/2021 - Euflexxa injection\par \par \par 3/14/2022 - 66 year old male s/p revision right total knee replacement in 2019 presents to the office today continuing to complain of pain and buckling in the right knee. Patient complains mostly of anterior knee pain. He reports having a stent placed 2 weeks ago and is now on Plavix so is unable to take any NSAIDs for pain. Patient was here in November for a Euflexxa injection into his left knee and is doing okay with that. Patient is here today to discuss his next options for pain relief.

## 2022-03-14 NOTE — PHYSICAL EXAM
[de-identified] : Right knee\par Inspection: no effusion or erythema.\par Wounds: well healed incision \par Alignment: normal.\par Palpation: no specific tenderness on palpation.\par ROM: 0-100 degrees, with 1-2mm of varus valgus laxity in full extension and 30 degrees of flexion. 3-5mm of varus valgus laxity with the knee in 90 degrees of flexion. \par Ligamentous laxity: all ligaments appear \par Muscle Test: good quad strength.\par Leg examination: calf is soft and non-tender.\par  \par Left hip\par Inspection: No swelling or ecchymosis.\par Wounds: none.\par Palpation: non-tender.\par Stability: no instability.\par Strength: 5/5 all motor groups.\par ROM: no pain with FROM\par Leg length: equal.\par \par Right hip\par Inspection: No swelling or ecchymosis.\par Wounds: none.\par Palpation: non-tender.\par Stability: no instability.\par Strength: 5/5 all motor groups.\par ROM: No pain with range of motion\par Leg length: equal. [de-identified] : Radiographs done today AP lateral and skyline of the right knee shows well aligned, well fixed, cemented primary tibia and revision femur with hybrid fixation. Left knee shows 50% joint space narrowing in the medial compartment of the left knee. \par \par Radiographs done today AP pelvis and lateral of both hips shows mild degenerative changes.

## 2022-03-14 NOTE — ASSESSMENT
[FreeTextEntry1] : 04/07/2021- S/p rev RTK 01/10/2019 , pt had done well initially after revision , but now complains of pain while walking in lateral mid thigh which radiates beyond the knee into the lateral calf. I explained to the pt his blood work shows no evidence of infection. Radiographs show implants to be well fixed with no evidence of loosening. And knee appears to be stable through ROM on examination. The fact that his pain extends well beyond the knee suggests that some of his pain is coming from LS spine. He is under the care of pain management for low back pain. Some of the pain could be coming from ITB and periarticular soft tissues. And for that we will send to PT. \par \par 07/21/2021- Pt presents for follow up of his RTK, still complaining of primarily anterior knee pain with activities and at rest. For the sake of completeness we also x-rayed his hip which showed mild arthritic changes. This is an unlikely source of his arthritic knee pain. He has had extensive PT as well. I do not have a good explanation for his pain. He will f/u in 3 months time. \par \par 10/20/2021- Patient is here for follow up of his right revision total knee. He still has complaints of periarticular pain that additionally has, I believe, pain which may be referred from his right hip and right lower lumbar area. His knee exam today demonstrates excellent ROM and stability. His right hip exam is also benign today, but he does have a history of a previous hip scope with labral repair. In reviewing his old hip xrays show a cyst in the femoral head. We will observe this going forward. In the meantime the patient is encouraged to be active and walk as much as possible. He compliains of medial sided left knee pain, he has 50% loss of the medial joint space on the left knee. He has medial compartment arthritis. We will order gel injections for the left knee. \par \par 3/14/2022 - s/p revision of right total knee, he has complaints of kristyn articular pain and is tender to palpation over the patellar tendon and the quadriceps tendon. His knee exam reveals excellent ROM and stability. The radiographs shows the implant to be well aligned and well fixed. I explained that this is soft tissue pain, not related to the implants. I recommended PT but he declined. 2 weeks ago he had a cardiac stent for which he is now taking Plavix so we are unable to use NSAIDs. For now, he will use Tylenol and voltaren Gel.

## 2022-03-15 DIAGNOSIS — I25.10 ATHEROSCLEROTIC HEART DISEASE OF NATIVE CORONARY ARTERY WITHOUT ANGINA PECTORIS: ICD-10-CM

## 2022-03-15 DIAGNOSIS — I10 ESSENTIAL (PRIMARY) HYPERTENSION: ICD-10-CM

## 2022-03-15 DIAGNOSIS — Z96.651 PRESENCE OF RIGHT ARTIFICIAL KNEE JOINT: ICD-10-CM

## 2022-03-15 DIAGNOSIS — R06.00 DYSPNEA, UNSPECIFIED: ICD-10-CM

## 2022-03-15 DIAGNOSIS — Z99.89 DEPENDENCE ON OTHER ENABLING MACHINES AND DEVICES: ICD-10-CM

## 2022-03-15 DIAGNOSIS — Z82.49 FAMILY HISTORY OF ISCHEMIC HEART DISEASE AND OTHER DISEASES OF THE CIRCULATORY SYSTEM: ICD-10-CM

## 2022-03-15 DIAGNOSIS — I47.2 VENTRICULAR TACHYCARDIA: ICD-10-CM

## 2022-03-15 DIAGNOSIS — Z87.891 PERSONAL HISTORY OF NICOTINE DEPENDENCE: ICD-10-CM

## 2022-03-15 DIAGNOSIS — G47.33 OBSTRUCTIVE SLEEP APNEA (ADULT) (PEDIATRIC): ICD-10-CM

## 2022-03-15 DIAGNOSIS — E66.9 OBESITY, UNSPECIFIED: ICD-10-CM

## 2022-03-16 ENCOUNTER — APPOINTMENT (OUTPATIENT)
Dept: CARDIOLOGY | Facility: CLINIC | Age: 67
End: 2022-03-16
Payer: COMMERCIAL

## 2022-03-16 VITALS
WEIGHT: 315 LBS | SYSTOLIC BLOOD PRESSURE: 164 MMHG | DIASTOLIC BLOOD PRESSURE: 98 MMHG | HEART RATE: 80 BPM | BODY MASS INDEX: 41.75 KG/M2 | HEIGHT: 73 IN | TEMPERATURE: 97.3 F

## 2022-03-16 DIAGNOSIS — I10 ESSENTIAL (PRIMARY) HYPERTENSION: ICD-10-CM

## 2022-03-16 PROCEDURE — 99214 OFFICE O/P EST MOD 30 MIN: CPT

## 2022-03-16 PROCEDURE — 93000 ELECTROCARDIOGRAM COMPLETE: CPT

## 2022-03-16 NOTE — PHYSICAL EXAM
[Well Developed] : well developed [Well Nourished] : well nourished [No Acute Distress] : no acute distress [Normal Venous Pressure] : normal venous pressure [Normal Conjunctiva] : normal conjunctiva [No Carotid Bruit] : no carotid bruit [No Murmur] : no murmur [No Rub] : no rub [No Gallop] : no gallop [Clear Lung Fields] : clear lung fields [Good Air Entry] : good air entry [No Respiratory Distress] : no respiratory distress  [Soft] : abdomen soft [Non Tender] : non-tender [No Masses/organomegaly] : no masses/organomegaly [Normal Bowel Sounds] : normal bowel sounds [Normal Gait] : normal gait [No Edema] : no edema [No Cyanosis] : no cyanosis [No Clubbing] : no clubbing [No Varicosities] : no varicosities [No Rash] : no rash [No Skin Lesions] : no skin lesions [Moves all extremities] : moves all extremities [No Focal Deficits] : no focal deficits [Normal Speech] : normal speech [Alert and Oriented] : alert and oriented [Normal memory] : normal memory [de-identified] : systolic murmur

## 2022-03-16 NOTE — ASSESSMENT
[FreeTextEntry1] : cad\par anginal equivalent\par new onset exertional sob\par asmi by ekg with global ischemia\par systolic heart murmur\par htn\par diabetes\par s/p pci of proximal lad

## 2022-03-16 NOTE — HISTORY OF PRESENT ILLNESS
[FreeTextEntry1] : 67 y/o white male with a negative cv history in the past.\par states that about 3 weeks ago developed new onset sob\par states that this sob is getting worse over the past 3 weeks and advised cardiac evaluatio\par patient has hypertension, diabetes, obesity and hyperlipidemia as his risk factors for cad\par ekg in office reveals nsr, anteroseptal q waves and global ischemia\par \par patient denies chf, arrythmias, syncope or heart murmurs\par no tia, cva or pvd\par \par cath revealed severe prox lad with positive ifr\par pci done without complications\par lv function is normal\par

## 2022-03-21 ENCOUNTER — LABORATORY RESULT (OUTPATIENT)
Age: 67
End: 2022-03-21

## 2022-03-21 ENCOUNTER — APPOINTMENT (OUTPATIENT)
Age: 67
End: 2022-03-21
Payer: COMMERCIAL

## 2022-03-21 VITALS
DIASTOLIC BLOOD PRESSURE: 88 MMHG | RESPIRATION RATE: 14 BRPM | BODY MASS INDEX: 41.75 KG/M2 | WEIGHT: 315 LBS | HEIGHT: 73 IN | HEART RATE: 91 BPM | SYSTOLIC BLOOD PRESSURE: 140 MMHG | OXYGEN SATURATION: 96 %

## 2022-03-21 DIAGNOSIS — T84.84XA PAIN DUE TO INTERNAL ORTHOPEDIC PROSTHETIC DEVICES, IMPLANTS AND GRAFTS, INITIAL ENCOUNTER: ICD-10-CM

## 2022-03-21 DIAGNOSIS — I10 ESSENTIAL (PRIMARY) HYPERTENSION: ICD-10-CM

## 2022-03-21 DIAGNOSIS — Z87.891 PERSONAL HISTORY OF NICOTINE DEPENDENCE: ICD-10-CM

## 2022-03-21 DIAGNOSIS — Z80.9 FAMILY HISTORY OF MALIGNANT NEOPLASM, UNSPECIFIED: ICD-10-CM

## 2022-03-21 DIAGNOSIS — Z83.3 FAMILY HISTORY OF DIABETES MELLITUS: ICD-10-CM

## 2022-03-21 PROCEDURE — 99203 OFFICE O/P NEW LOW 30 MIN: CPT

## 2022-03-21 NOTE — HISTORY OF PRESENT ILLNESS
[Shortness of Breath] : Shortness of Breath [Dyspnea] : dyspnea [Fatigue] : fatigue [Wheezing] : no wheezing [Fever] : no fever [Weight Gain] : weight gain [Leg Pain] : no leg pain [TextBox_4] : [place] USING CPAP\par SOB ON EXERTION SAW CARDIO SP CATH STENT\par NO IMPROVEMENT OF SOB\par REMOTE HX OS SMOKING

## 2022-03-21 NOTE — DISCUSSION/SUMMARY
[FreeTextEntry1] : SOB ON EXERTION, EX SMOKER\par [place]\par D DIMER, CHEST CT WITH OR WITHOUT\par PFT\par WEIGHT LOSS\par SP CARDIO SP STENT\par PRIOR CXR REVIEWED

## 2022-03-21 NOTE — REVIEW OF SYSTEMS
[Fatigue] : fatigue [Poor Appetite] : poor appetite [SOB on Exertion] : sob on exertion [Negative] : Neurologic

## 2022-03-31 ENCOUNTER — OUTPATIENT (OUTPATIENT)
Dept: OUTPATIENT SERVICES | Facility: HOSPITAL | Age: 67
LOS: 1 days | Discharge: HOME | End: 2022-03-31
Payer: COMMERCIAL

## 2022-03-31 ENCOUNTER — RESULT REVIEW (OUTPATIENT)
Age: 67
End: 2022-03-31

## 2022-03-31 DIAGNOSIS — R06.00 DYSPNEA, UNSPECIFIED: ICD-10-CM

## 2022-03-31 DIAGNOSIS — Z98.890 OTHER SPECIFIED POSTPROCEDURAL STATES: Chronic | ICD-10-CM

## 2022-03-31 DIAGNOSIS — J84.9 INTERSTITIAL PULMONARY DISEASE, UNSPECIFIED: ICD-10-CM

## 2022-03-31 PROCEDURE — 71250 CT THORAX DX C-: CPT | Mod: 26

## 2022-04-08 ENCOUNTER — APPOINTMENT (OUTPATIENT)
Dept: CARDIOLOGY | Facility: CLINIC | Age: 67
End: 2022-04-08
Payer: COMMERCIAL

## 2022-04-08 ENCOUNTER — APPOINTMENT (OUTPATIENT)
Dept: CARDIOLOGY | Facility: CLINIC | Age: 67
End: 2022-04-08
Payer: MEDICARE

## 2022-04-08 VITALS
OXYGEN SATURATION: 93 % | HEIGHT: 73 IN | BODY MASS INDEX: 41.75 KG/M2 | TEMPERATURE: 97.9 F | WEIGHT: 315 LBS | HEART RATE: 83 BPM | SYSTOLIC BLOOD PRESSURE: 140 MMHG | DIASTOLIC BLOOD PRESSURE: 84 MMHG

## 2022-04-08 DIAGNOSIS — R25.2 CRAMP AND SPASM: ICD-10-CM

## 2022-04-08 DIAGNOSIS — I87.2 VENOUS INSUFFICIENCY (CHRONIC) (PERIPHERAL): ICD-10-CM

## 2022-04-08 PROCEDURE — 93970 EXTREMITY STUDY: CPT

## 2022-04-08 PROCEDURE — 99204 OFFICE O/P NEW MOD 45 MIN: CPT

## 2022-04-08 PROCEDURE — 93923 UPR/LXTR ART STDY 3+ LVLS: CPT

## 2022-04-08 PROCEDURE — 99214 OFFICE O/P EST MOD 30 MIN: CPT

## 2022-04-08 RX ORDER — ATORVASTATIN CALCIUM 20 MG/1
20 TABLET, FILM COATED ORAL DAILY
Qty: 90 | Refills: 3 | Status: DISCONTINUED | COMMUNITY
End: 2022-04-08

## 2022-04-08 RX ORDER — ASPIRIN 81 MG
81 TABLET, DELAYED RELEASE (ENTERIC COATED) ORAL DAILY
Refills: 0 | Status: ACTIVE | COMMUNITY

## 2022-04-08 NOTE — PHYSICAL EXAM
[General Appearance - Well Developed] : well developed [Normal Appearance] : normal appearance [General Appearance - Well Nourished] : well nourished [No Deformities] : no deformities [Normal Conjunctiva] : the conjunctiva exhibited no abnormalities [Eyelids - No Xanthelasma] : the eyelids demonstrated no xanthelasmas [Normal Oral Mucosa] : normal oral mucosa [No Oral Pallor] : no oral pallor [No Oral Cyanosis] : no oral cyanosis [Normal Jugular Venous A Waves Present] : normal jugular venous A waves present [Normal Jugular Venous V Waves Present] : normal jugular venous V waves present [No Jugular Venous Weeks A Waves] : no jugular venous weeks A waves [Heart Rate And Rhythm] : heart rate and rhythm were normal [Heart Sounds] : normal S1 and S2 [Murmurs] : no murmurs present [Respiration, Rhythm And Depth] : normal respiratory rhythm and effort [Exaggerated Use Of Accessory Muscles For Inspiration] : no accessory muscle use [Auscultation Breath Sounds / Voice Sounds] : lungs were clear to auscultation bilaterally [Abdomen Soft] : soft [Abdomen Tenderness] : non-tender [Abdomen Mass (___ Cm)] : no abdominal mass palpated [Edema] : no peripheral edema present [Abnormal Walk] : normal gait [Nail Clubbing] : no clubbing of the fingernails [Cyanosis, Localized] : no localized cyanosis [] : no rash [No Skin Ulcers] : no skin ulcer [No Xanthoma] : no  xanthoma was observed [Oriented To Time, Place, And Person] : oriented to person, place, and time [Affect] : the affect was normal [Mood] : the mood was normal [No Anxiety] : not feeling anxious [FreeTextEntry1] : Bilateral lower extremity varicosities, hyperpigmentation

## 2022-04-08 NOTE — REVIEW OF SYSTEMS
[Dyspnea on exertion] : dyspnea during exertion [Joint Pain] : joint pain [Muscle Cramps] : muscle cramps [Change In Color Of Skin] : change in skin color [Negative] : Heme/Lymph [Chest Discomfort] : no chest discomfort [Leg Claudication] : no intermittent leg claudication [Palpitations] : no palpitations [Syncope] : no syncope [FreeTextEntry9] : intermittent lower extremity swelling [de-identified] : bilateral lower extremity skin pigmentation and varicosities

## 2022-04-08 NOTE — ASSESSMENT
[FreeTextEntry1] : 67 y/o M with PMH of CAD s/p PCI+stent to LAD 3 weeks ago March 2021, HTN, DM, DLD, [place] on CPAP, right knee replacement, ex-smoker quit 20 years ago presented for evaluation of bilateral lower extremity abnormalities\par \par Assessment:\par Venous insufficieny\par Varicose veins\par Bilateral cramps of legs\par Right knee pain\par DLD\par \par Plan:\par SVETLANA/PVR\par Venous duplex of lower extremity superficial veins and reflux study\par increase atorvastatin to 40 mg po once daily\par continue use of compression stocking

## 2022-04-08 NOTE — HISTORY OF PRESENT ILLNESS
[FreeTextEntry1] : 65 y/o M with PMH of CAD s/p PCI+stent to LAD 3 weeks ago March 2021, HTN, DM, DLD, [place] on CPAP, right knee replacement, ex-smoker quit 20 years ago presented for evaluation of bilateral lower extremity abnormalities. Pt reports progressive bilateral skin pigmentation and varicosities that started since major fall which he ended up with right hip labrum tear and right knee replacement. Pt reports intermittent swelling in bilateral leg and occasional cramps. Pt admits to dyspnea on exertion which has lessened since the stent placed 3 weeks. Otherwise, pt is able to walk and use stairs but limited by right knee pain which has been persistent since right knee surgery in 2013.\par \par

## 2022-04-17 ENCOUNTER — LABORATORY RESULT (OUTPATIENT)
Age: 67
End: 2022-04-17

## 2022-04-20 ENCOUNTER — OUTPATIENT (OUTPATIENT)
Dept: OUTPATIENT SERVICES | Facility: HOSPITAL | Age: 67
LOS: 1 days | Discharge: HOME | End: 2022-04-20
Payer: COMMERCIAL

## 2022-04-20 DIAGNOSIS — Z87.891 PERSONAL HISTORY OF NICOTINE DEPENDENCE: ICD-10-CM

## 2022-04-20 DIAGNOSIS — Z98.890 OTHER SPECIFIED POSTPROCEDURAL STATES: Chronic | ICD-10-CM

## 2022-04-20 PROCEDURE — 94729 DIFFUSING CAPACITY: CPT | Mod: 26

## 2022-04-20 PROCEDURE — 94727 GAS DIL/WSHOT DETER LNG VOL: CPT | Mod: 26

## 2022-04-20 PROCEDURE — 94060 EVALUATION OF WHEEZING: CPT | Mod: 26

## 2022-05-05 ENCOUNTER — NON-APPOINTMENT (OUTPATIENT)
Age: 67
End: 2022-05-05

## 2022-05-09 RX ORDER — PRASUGREL 10 MG/1
10 TABLET, FILM COATED ORAL DAILY
Qty: 90 | Refills: 3 | Status: ACTIVE | COMMUNITY
Start: 2022-03-09 | End: 1900-01-01

## 2022-05-09 RX ORDER — METOPROLOL SUCCINATE 25 MG/1
25 TABLET, EXTENDED RELEASE ORAL
Qty: 90 | Refills: 2 | Status: ACTIVE | COMMUNITY
Start: 2022-03-09 | End: 1900-01-01

## 2022-05-12 ENCOUNTER — APPOINTMENT (OUTPATIENT)
Dept: CARDIOLOGY | Facility: CLINIC | Age: 67
End: 2022-05-12
Payer: COMMERCIAL

## 2022-05-12 VITALS
WEIGHT: 315 LBS | HEART RATE: 70 BPM | BODY MASS INDEX: 41.96 KG/M2 | DIASTOLIC BLOOD PRESSURE: 80 MMHG | SYSTOLIC BLOOD PRESSURE: 130 MMHG

## 2022-05-12 DIAGNOSIS — M17.11 UNILATERAL PRIMARY OSTEOARTHRITIS, RIGHT KNEE: ICD-10-CM

## 2022-05-12 DIAGNOSIS — M76.30 ILIOTIBIAL BAND SYNDROME, UNSPECIFIED LEG: ICD-10-CM

## 2022-05-12 DIAGNOSIS — I83.893 VARICOSE VEINS OF BILATERAL LOWER EXTREMITIES WITH OTHER COMPLICATIONS: ICD-10-CM

## 2022-05-12 DIAGNOSIS — E78.5 HYPERLIPIDEMIA, UNSPECIFIED: ICD-10-CM

## 2022-05-12 PROCEDURE — 93000 ELECTROCARDIOGRAM COMPLETE: CPT

## 2022-05-12 PROCEDURE — 99214 OFFICE O/P EST MOD 30 MIN: CPT

## 2022-05-12 RX ORDER — ATORVASTATIN CALCIUM 40 MG/1
40 TABLET, FILM COATED ORAL DAILY
Qty: 90 | Refills: 3 | Status: DISCONTINUED | COMMUNITY
Start: 2022-04-08 | End: 2022-05-12

## 2022-05-12 RX ORDER — ATORVASTATIN CALCIUM 20 MG/1
20 TABLET, FILM COATED ORAL
Qty: 30 | Refills: 5 | Status: ACTIVE | COMMUNITY

## 2022-05-12 NOTE — LETTER BODY
[To Whom it May Concern:] : To Whom it May Concern: [FreeTextEntry1] : This letter is to confirm that Mr. Smith Puga is under my care for Coronary artery disease and hypertension. Pt was  seen at our  office for a cardiac evaluation on March 2nd, 2022. \par Mr. Abarca had a Cardiac catherization and Coronary stent placement on 03/8/22. \par \par Please excuse Mr. Abarca from work from 03/02/22 - 03/25/22.  Patient may return back to work on Monday March 28,2022 with no restrictions. \par \par \par If you have any further questions, please feel free to contact my office at [phone].\par \par  [FreeTextEntry3] : \par \par \par \par Calos Flanagan MD, FACC

## 2022-05-12 NOTE — PHYSICAL EXAM

## 2022-05-12 NOTE — HISTORY OF PRESENT ILLNESS
[FreeTextEntry1] : 67 y/o white male with a negative cv history in the past.\par states that about 3 weeks ago developed new onset sob\par states that this sob is getting worse over the past 3 weeks and advised cardiac evaluation\par patient has hypertension, diabetes, obesity and hyperlipidemia as his risk factors for cad\par ekg in office reveals nsr, anteroseptal q waves and global ischemia\par \par patient denies chf, arrythmias, syncope or heart murmurs\par no tia, cva or pvd\par \par cath revealed severe prox lad with positive ifr\par pci done without complications\par lv function is normal\par \par seen vascular and studies of lower extremity were normal\par medical therapy

## 2022-06-10 ENCOUNTER — APPOINTMENT (OUTPATIENT)
Dept: CARDIOLOGY | Facility: CLINIC | Age: 67
End: 2022-06-10

## 2022-07-11 ENCOUNTER — OUTPATIENT (OUTPATIENT)
Dept: OUTPATIENT SERVICES | Facility: HOSPITAL | Age: 67
LOS: 1 days | Discharge: HOME | End: 2022-07-11

## 2022-07-11 ENCOUNTER — RESULT REVIEW (OUTPATIENT)
Age: 67
End: 2022-07-11

## 2022-07-11 DIAGNOSIS — Z96.651 PRESENCE OF RIGHT ARTIFICIAL KNEE JOINT: ICD-10-CM

## 2022-07-11 DIAGNOSIS — M25.562 PAIN IN LEFT KNEE: ICD-10-CM

## 2022-07-11 DIAGNOSIS — Z98.890 OTHER SPECIFIED POSTPROCEDURAL STATES: Chronic | ICD-10-CM

## 2022-07-11 PROCEDURE — 73562 X-RAY EXAM OF KNEE 3: CPT | Mod: 26,50

## 2022-07-13 ENCOUNTER — APPOINTMENT (OUTPATIENT)
Dept: ORTHOPEDIC SURGERY | Facility: CLINIC | Age: 67
End: 2022-07-13

## 2022-07-20 ENCOUNTER — APPOINTMENT (OUTPATIENT)
Dept: ORTHOPEDIC SURGERY | Facility: CLINIC | Age: 67
End: 2022-07-20

## 2022-07-20 PROCEDURE — 99214 OFFICE O/P EST MOD 30 MIN: CPT

## 2022-07-20 PROCEDURE — 99072 ADDL SUPL MATRL&STAF TM PHE: CPT

## 2022-07-21 NOTE — PHYSICAL EXAM
[de-identified] : Right knee\par Inspection: no effusion or erythema.\par Wounds: well healed incision \par Alignment: normal.\par Palpation: no specific tenderness on palpation.\par ROM: 0-100 degrees, with 1-2mm of varus valgus laxity in full extension and 30 degrees of flexion. 3-5mm of varus valgus laxity with the knee in 90 degrees of flexion. \par Ligamentous laxity: all ligaments appear \par Muscle Test: good quad strength.\par Leg examination: calf is soft and non-tender.\par  [de-identified] : Radiographs done today AP lateral and skyline of the right knee shows well aligned, well fixed, cemented primary tibia and revision femur with hybrid fixation.

## 2022-07-21 NOTE — ASSESSMENT
[FreeTextEntry1] : 04/07/2021- S/p rev RTK 01/10/2019 , pt had done well initially after revision , but now complains of pain while walking in lateral mid thigh which radiates beyond the knee into the lateral calf. I explained to the pt his blood work shows no evidence of infection. Radiographs show implants to be well fixed with no evidence of loosening. And knee appears to be stable through ROM on examination. The fact that his pain extends well beyond the knee suggests that some of his pain is coming from LS spine. He is under the care of pain management for low back pain. Some of the pain could be coming from ITB and periarticular soft tissues. And for that we will send to PT. \par \par 07/21/2021- Pt presents for follow up of his RTK, still complaining of primarily anterior knee pain with activities and at rest. For the sake of completeness we also x-rayed his hip which showed mild arthritic changes. This is an unlikely source of his arthritic knee pain. He has had extensive PT as well. I do not have a good explanation for his pain. He will f/u in 3 months time. \par \par 10/20/2021- Patient is here for follow up of his right revision total knee. He still has complaints of periarticular pain that additionally has, I believe, pain which may be referred from his right hip and right lower lumbar area. His knee exam today demonstrates excellent ROM and stability. His right hip exam is also benign today, but he does have a history of a previous hip scope with labral repair. In reviewing his old hip xrays show a cyst in the femoral head. We will observe this going forward. In the meantime the patient is encouraged to be active and walk as much as possible. He compliains of medial sided left knee pain, he has 50% loss of the medial joint space on the left knee. He has medial compartment arthritis. We will order gel injections for the left knee. \par \par 3/14/2022 - s/p revision of right total knee, he has complaints of kristyn articular pain and is tender to palpation over the patellar tendon and the quadriceps tendon. His knee exam reveals excellent ROM and stability. The radiographs shows the implant to be well aligned and well fixed. I explained that this is soft tissue pain, not related to the implants. I recommended PT but he declined. 2 weeks ago he had a cardiac stent for which he is now taking Plavix so we are unable to use NSAIDs. For now, he will use Tylenol and voltaren Gel. \par \par 7/19/2022- S/p revision RTK, presents to the office for a follow up. His CC today is he has pain with terminal extension (the last 30 degrees of extension) which at times causes his knee to buckle. On his exam i was able to get him to fully extend the knee although he said it was painful. While he did this I did not feel or hear any crepitus that would suggest painful scar tissue. His knee is quite stable throughout the ROM. A constrained insert was utilized as part of his revision. His radiographs look pristine and show no evidence of loosening. It is unclear to me as to why he is having this pain. For the sake of completeness we will send him for an ESR and CRP. After reviewing these results we may consider either a bone scan or an MRI. He was offered PT but declined.

## 2022-07-21 NOTE — HISTORY OF PRESENT ILLNESS
[de-identified] : 4/7/2021 - 65 year old male s/p revision right total knee replacement presents to the office today for a follow up. Patient continues complaining of pain in the knee. He states that he feels the knee andrew a few times a month. Patient states he can only ambulate about one block and then needs rest due to the pain. He is currently taking Aleve for pain occasionally with only some relief. There is pain in the knee at night which he reports wakes him up. When we last saw him we obtained an ESR/CRP which came back within normal limits. Patient is here today to discuss his next options for pain relief.\par \par 7/21/2021 - 65 year old male s/p revision right total knee replacement presents to the office today for a follow up. He reports doing PT since we last saw him but states he did it for 12 weeks without relief. Pt states in the last week his knee has buckled twice. While he reports having pain, he denies taking anything for it. \par \par 10/20/2021 - 65 year old male s/p revision right total knee replacement presents to the office today for a follow up. \par \par 11/8/2021 - Euflexxa injection\par \par 11/17/2021 -  Euflexxa injection\par \par 11/22/2021 - Euflexxa injection\par \par \par 3/14/2022 - 66 year old male s/p revision right total knee replacement in 2019 presents to the office today continuing to complain of pain and buckling in the right knee. Patient complains mostly of anterior knee pain. He reports having a stent placed 2 weeks ago and is now on Plavix so is unable to take any NSAIDs for pain. Patient was here in November for a Euflexxa injection into his left knee and is doing okay with that. Patient is here today to discuss his next options for pain relief.\par \par 7/20/2022 -  66 year old male s/p revision right total knee replacement in 2019 presents to the office today complaining of buckling in his right knee. Patient reports a sensation of the knee buckling forward. Patient has been ambulating with a cane due to this. Patient states that after the sensation of his knee buckling, he then has difficulty with bearing weight. He reports taking Advil and Tylenol occasionally for pain. Patient is here today to discuss his next steps.

## 2022-07-26 LAB
CRP SERPL-MCNC: 6 MG/L
ERYTHROCYTE [SEDIMENTATION RATE] IN BLOOD BY WESTERGREN METHOD: 17 MM/HR

## 2022-08-01 ENCOUNTER — APPOINTMENT (OUTPATIENT)
Dept: ORTHOPEDIC SURGERY | Facility: CLINIC | Age: 67
End: 2022-08-01

## 2022-08-04 ENCOUNTER — APPOINTMENT (OUTPATIENT)
Age: 67
End: 2022-08-04

## 2022-08-18 ENCOUNTER — RESULT CHARGE (OUTPATIENT)
Age: 67
End: 2022-08-18

## 2022-08-18 ENCOUNTER — APPOINTMENT (OUTPATIENT)
Dept: CARDIOLOGY | Facility: CLINIC | Age: 67
End: 2022-08-18

## 2022-08-18 VITALS
HEIGHT: 73 IN | BODY MASS INDEX: 41.75 KG/M2 | SYSTOLIC BLOOD PRESSURE: 140 MMHG | HEART RATE: 78 BPM | DIASTOLIC BLOOD PRESSURE: 82 MMHG | WEIGHT: 315 LBS

## 2022-08-18 DIAGNOSIS — G47.33 OBSTRUCTIVE SLEEP APNEA (ADULT) (PEDIATRIC): ICD-10-CM

## 2022-08-18 DIAGNOSIS — I25.10 ATHEROSCLEROTIC HEART DISEASE OF NATIVE CORONARY ARTERY W/OUT ANGINA PECTORIS: ICD-10-CM

## 2022-08-18 DIAGNOSIS — R06.02 SHORTNESS OF BREATH: ICD-10-CM

## 2022-08-18 DIAGNOSIS — E66.09 OTHER OBESITY DUE TO EXCESS CALORIES: ICD-10-CM

## 2022-08-18 PROCEDURE — 93000 ELECTROCARDIOGRAM COMPLETE: CPT

## 2022-08-18 PROCEDURE — 99214 OFFICE O/P EST MOD 30 MIN: CPT | Mod: 25

## 2022-08-18 RX ORDER — HYDRALAZINE HYDROCHLORIDE 50 MG/1
50 TABLET ORAL 3 TIMES DAILY
Qty: 90 | Refills: 3 | Status: ACTIVE | COMMUNITY
Start: 2022-03-16 | End: 1900-01-01

## 2022-08-18 NOTE — PHYSICAL EXAM

## 2022-08-18 NOTE — LETTER BODY
[To Whom it May Concern:] : To Whom it May Concern: [FreeTextEntry1] : This letter is to confirm that Mr. Smith Puga is under my care for Coronary artery disease and hypertension. Pt was  seen at our  office for a cardiac evaluation on March 2nd, 2022. \par Mr. Abarca had a Cardiac catherization and Coronary stent placement on 03/8/22. \par \par Please excuse Mr. Abarca from work from 03/02/22 - 03/25/22.  Patient may return back to work on Monday March 28,2022 with no restrictions. \par \par \par If you have any further questions, please feel free to contact my office at [phone].\par \par  [FreeTextEntry3] : \par \par \par \par Calos Flanagan MD, FACC

## 2022-11-10 ENCOUNTER — APPOINTMENT (OUTPATIENT)
Dept: CARDIOLOGY | Facility: CLINIC | Age: 67
End: 2022-11-10

## 2023-02-01 NOTE — PATIENT PROFILE ADULT - FUNCTIONAL ASSESSMENT - BASIC MOBILITY 2.
[facility] for patient updates. Left message for VINCENT Savage for return call.   Emily Ogden BSN  Care Transition Nurse for ortho bundle  [phone] = No assist / stand by assistance

## 2023-03-13 ENCOUNTER — APPOINTMENT (OUTPATIENT)
Dept: ORTHOPEDIC SURGERY | Facility: CLINIC | Age: 68
End: 2023-03-13

## 2023-08-30 NOTE — HISTORY OF PRESENT ILLNESS
Discharge Instructions - Atrial Fibrillation Ablation     You have had a procedure called catheter ablation for atrial fibrillation. It was used to treat an abnormal heartbeat (arrhythmia). This procedure destroyed (ablated) the cells in your heart that were causing your heart rhythm problem. During the procedure, the healthcare provider put a thin, flexible wire (catheter) into a blood vessel in your groin. The physician then threaded the catheter to your heart and destroyed the cells causing the problem. Puncture Site Care     You will generally have three sets of puncture sites on the right side of the groin, keep the area clean. While coughing, sneezing, or during bowel movements, support the puncture site by applying gentle compression with the palm of your hand. You may shower when you get home and remove the dressing. Do not immerse site in water. DO NOT go in a bathtub, pool, ocean, or lake until completely site is completely healed in about 7-10 days. Avoid any lotions, powders or creams to the puncture site until fully healed. You may notice a lump at the puncture site smaller than the size of a quarter. This is normal.   No spicy foods for one month. Activity     No driving for 2 days after the procedure. Someone must drive you home from the hospital.   No lifting more than 10 pounds for 3 days after the ablation. Expect to be able to go back to your normal daily activities in the next 2 days. These include walking, climbing stairs, and doing household chores. You may resume normal activity after 1 week but avoid strenuous activities for 2 weeks. Ask your healthcare provider when you can return to work. What to expect after your ablation     During the first 48 hours after an ablation, some patients may experience the following:     Mild Chest Discomfort - for a week or so, due to post procedure inflammation. The pain will often worsen with a deep breath or when leaning forward. [de-identified] : 6/3/2019 - 63 year old male s/p revision of right total knee replacement presents to the office today for a follow up and complaints of right hip pain. Pt was last here May 10 for a Kenalog injection in his painful left knee, which he reports gave him good relief. He reports pain with ambulation and an occasional locking sensation. He denies taking anything for pain in regard to his right knee. Pt reports noticing pain in the right hip when laying in bed, long periods of standing, and ambulating on an incline. Pt states that his right hip pain comes and goes. Pt states that he had a plastic dhara due to torn cartilage in his hip and suffered from a labrum tear after a work injury in 2012. \par \par 7/10/2019 - 63 year old male s/p revision of right total knee replacement presents to the office today for a discussion regarding his progress.  Pt reports a sensation of the knee giving way.  Pt states that he had a plastic dhara due to torn cartilage in his hip and suffered from a labrum tear after a work injury in 2012. Pt denies having done physical therapy yet. He also denies taking Mobic that was prescribed to him due to the fear of the side effects.

## 2023-10-29 NOTE — H&P CARDIOLOGY - TIME:
12:32
Assistance with ambulation/Assistance OOB with selected safe patient handling equipment/Communicate Risk of Fall with Harm to all staff/Reinforce activity limits and safety measures with patient and family/Tailored Fall Risk Interventions/Visual Cue: Yellow wristband and red socks/Bed in lowest position, wheels locked, appropriate side rails in place/Call bell, personal items and telephone in reach/Instruct patient to call for assistance before getting out of bed or chair/Non-slip footwear when patient is out of bed/Crane to call system/Physically safe environment - no spills, clutter or unnecessary equipment/Purposeful Proactive Rounding/Room/bathroom lighting operational, light cord in reach

## 2024-09-30 NOTE — ASU PREOP CHECKLIST - ISOLATION PRECAUTIONS
none
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